# Patient Record
Sex: MALE | Race: WHITE | Employment: OTHER | URBAN - METROPOLITAN AREA
[De-identification: names, ages, dates, MRNs, and addresses within clinical notes are randomized per-mention and may not be internally consistent; named-entity substitution may affect disease eponyms.]

---

## 2017-01-11 ENCOUNTER — OFFICE VISIT (OUTPATIENT)
Dept: PAIN MANAGEMENT | Age: 52
End: 2017-01-11

## 2017-01-11 VITALS — HEIGHT: 69 IN | SYSTOLIC BLOOD PRESSURE: 148 MMHG | DIASTOLIC BLOOD PRESSURE: 86 MMHG | HEART RATE: 112 BPM

## 2017-01-11 DIAGNOSIS — M79.2 NEURITIS: ICD-10-CM

## 2017-01-11 DIAGNOSIS — Z79.899 ENCOUNTER FOR LONG-TERM (CURRENT) USE OF HIGH-RISK MEDICATION: ICD-10-CM

## 2017-01-11 DIAGNOSIS — R06.83 SNORES: ICD-10-CM

## 2017-01-11 DIAGNOSIS — F51.04 CHRONIC INSOMNIA: ICD-10-CM

## 2017-01-11 DIAGNOSIS — G89.4 CHRONIC PAIN SYNDROME: Primary | ICD-10-CM

## 2017-01-11 DIAGNOSIS — R10.84 ABDOMINAL PAIN, GENERALIZED: ICD-10-CM

## 2017-01-11 DIAGNOSIS — F17.200 SMOKER: ICD-10-CM

## 2017-01-11 DIAGNOSIS — K86.1 CHRONIC PANCREATITIS, UNSPECIFIED PANCREATITIS TYPE (HCC): ICD-10-CM

## 2017-01-11 LAB
ALCOHOL UR POC: NORMAL
AMPHETAMINES UR POC: NORMAL
BARBITURATES UR POC: NORMAL
BENZODIAZEPINES UR POC: NORMAL
BUPRENORPHINE UR POC: NORMAL
CANNABINOIDS UR POC: NORMAL
CARISOPRODOL UR POC: NORMAL
COCAINE UR POC: NORMAL
FENTANYL UR POC: NORMAL
MDMA/ECSTASY UR POC: NORMAL
METHADONE UR POC: NORMAL
METHAMPHETAMINE UR POC: NORMAL
METHYLPHENIDATE UR POC: NORMAL
OPIATES UR POC: NORMAL
OXYCODONE UR POC: NORMAL
PHENCYCLIDINE UR POC: NORMAL
PROPOXYPHENE UR POC: NORMAL
TRAMADOL UR POC: NORMAL
TRICYCLICS UR POC: NORMAL

## 2017-01-11 RX ORDER — HYDROMORPHONE HYDROCHLORIDE 2 MG/1
TABLET ORAL
Qty: 45 TAB | Refills: 0 | Status: SHIPPED | OUTPATIENT
Start: 2017-01-11 | End: 2017-04-23

## 2017-01-11 RX ORDER — HYDROMORPHONE HYDROCHLORIDE 2 MG/1
TABLET ORAL
Qty: 45 TAB | Refills: 0 | Status: SHIPPED | OUTPATIENT
Start: 2017-01-11 | End: 2018-08-21

## 2017-01-11 RX ORDER — MORPHINE SULFATE 30 MG/1
30 CAPSULE, EXTENDED RELEASE ORAL 2 TIMES DAILY
Qty: 60 CAP | Refills: 0 | Status: SHIPPED | OUTPATIENT
Start: 2017-01-11 | End: 2017-02-10

## 2017-01-11 NOTE — PROGRESS NOTES
HISTORY OF PRESENT ILLNESS  Orlando Cole is a 46 y.o. male. HPI  The patient presents today for follow up of chronic  severe pain which is generalized abdominal pain related to chronic, recurrent pancreatitis. His headaches cme go. At his last visit he was changed from percocet to roslyn bid whic is working fairly well. His work schedule has seemed to aggravate his pain but his current treament regimen is helping to manage his pain lvel. The patient denies any significant changes since last f/u. He tolerates medications without side effects. Orlando Cole reports no change in sleep or constipation. He takes zquil prn for sleep.  he does smoke and does snore. No history of sleep study. He is using the e-cigarette. He denies constipation. The patient reports 60-70% relief with current medications. His pain is characterized as burning, throbbing, aching, and cramping with nausea and rare vomiting. Sometimes foods will aggravate his pain (vinegar and pizza) along with the weather. .  Rest and medication alleviate his pain. 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 The patient reports functional improvement and QOL with pain medication. ,    He is complaining of episodes of breakthrough pain, which he does not have anything to cover him for this.  appears consistent'  UDS reviewed and appears consistent  Review of Systems   Constitutional: Positive for malaise/fatigue. Gastrointestinal: Positive for abdominal pain (cramping). Negative for constipation. 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 Psychiatric/Behavioral: The patient has insomnia. All other systems reviewed and are negative. Physical Exam   Constitutional: He is oriented to person, place, and time. He appears well-developed and well-nourished. No distress. HENT:   Head: Normocephalic.    Right Ear: External ear normal.   Left Ear: External ear normal.   Eyes: Conjunctivae and EOM are normal. Pupils are equal, round, and reactive to light. Neck: Normal range of motion. Pulmonary/Chest: Effort normal. No respiratory distress. Abdominal: There is tenderness in the right upper quadrant and right lower quadrant. Neurological: He is alert and oriented to person, place, and time. Skin: Skin is warm and dry. Psychiatric: He has a normal mood and affect. His behavior is normal. Judgment and thought content normal.   Nursing note and vitals reviewed. The patient was advised about elevated blood pressure to discuss with pcp. ASSESSMENT and PLAN  Encounter Diagnoses   Name Primary?  Chronic pain syndrome Yes    Abdominal pain, generalized     Chronic pancreatitis, unspecified pancreatitis type ([facility]     Encounter for long-term (current) use of high-risk medication     Diabetes mellitus, insulin dependent (IDDM), controlled (HCC)     Neuritis     Chronic insomnia     Snores     Smoker      Orders Placed This Encounter    DRUG SCREEN    REFERRAL TO SLEEP STUDIES    AMB POC DRUG SCREEN ()    Morphine (RIYA) 30 mg ER capsule    Morphine (RIYA) 30 mg ER capsule    HYDROmorphone (DILAUDID) 2 mg tablet    HYDROmorphone (DILAUDID) 2 mg tablet        Patient Instructions   1. Continue medications as prescribed. 2. Follow up in two months  3. Start dilaudid 2 mg take one tab up to twice a day pre severe pain (max #45 per month)/only for as needed pn   4. Continue riya as prescribed for your long acting opioid/continue to take on a schedule    No concern for misuse abuse or diversion  40 minutes spent in visit face to face with the patient.     >50% of time spent counseling and coordinating care

## 2017-01-11 NOTE — PROGRESS NOTES
Nursing Notes    Patient presents to the office today in follow-up. Patient rates his pain at 7/10 on the numerical pain scale. Reviewed medications with counts as follows:    Rx Date filled Qty Dispensed Pill Count Last Dose Short   Morphine sulfate ER 30 mg 12/12/16 60 5 today no                                    POC UDS was performed in office today. Any new labs or imaging since last appointment? NO    Have you been to an emergency room (ER) or urgent care clinic since your last visit? NO            Have you been hospitalized since your last visit? NO     If yes, where, when, and reason for visit? Have you seen or consulted any other health care providers outside of the [address] [place] [address]  since your last visit? NO     If yes, where, when, and reason for visit? HM deferred to pcp.

## 2017-01-11 NOTE — PATIENT INSTRUCTIONS
1.  Continue medications as prescribed. 2. Follow up in two months  3. Start dilaudid 2 mg take one tab up to twice a day pre severe pain (max #45 per month)/only for as needed pn   4.  Continue rolsyn as prescribed for your long acting opioid/continue to take on a schedule

## 2017-01-11 NOTE — MR AVS SNAPSHOT
Visit Information Date & Time Provider Department Dept. Phone Encounter #  
 1/11/2017  9:00 AM Emiliana Chavira, [address] [place] [address] for Pain Management [id number] Follow-up Instructions Return in about 2 months (around 3/11/2017). Upcoming Health Maintenance Date Due Hepatitis C Screening 1965 HEMOGLOBIN A1C Q6M 1965 LIPID PANEL Q1 1965 FOOT EXAM Q1 7/24/1975 MICROALBUMIN Q1 7/24/1975 EYE EXAM RETINAL OR DILATED Q1 7/24/1975 Pneumococcal 19-64 Medium Risk (1 of 1 - PPSV23) 7/24/1984 DTaP/Tdap/Td series (1 - Tdap) 7/24/1986 FOBT Q 1 YEAR AGE 50-75 7/24/2015 INFLUENZA AGE 9 TO ADULT 8/1/2016 Allergies as of 1/11/2017  Review Complete On: 1/11/2017 By: JUVENAL Suero Severity Noted Reaction Type Reactions Adhesive High 07/22/2016    Contact Dermatitis Severe blistering from steri strips following surgery (no systemic reaction or breathing issues) Current Immunizations  Never Reviewed No immunizations on file. Not reviewed this visit You Were Diagnosed With   
  
 Codes Comments Chronic pain syndrome    -  Primary ICD-10-CM: G89.4 ICD-9-CM: 338. 4 Abdominal pain, generalized     ICD-10-CM: R10.84 ICD-9-CM: 789.07 Chronic pancreatitis, unspecified pancreatitis type ([facility]     ICD-10-CM: K86.1 ICD-9-CM: 871.1 Encounter for long-term (current) use of high-risk medication     ICD-10-CM: Z79.899 ICD-9-CM: V58.69 Diabetes mellitus, insulin dependent (IDDM), controlled ([facility]     ICD-10-CM: E11.9, Z79.4 ICD-9-CM: 250.00, V58.67 Neuritis     ICD-10-CM: M79.2 ICD-9-CM: 729.2 Chronic insomnia     ICD-10-CM: F51.04 
ICD-9-CM: 780.52 Snores     ICD-10-CM: R06.83 
ICD-9-CM: 786.09 Smoker     ICD-10-CM: G86.205 ICD-9-CM: 305.1 Vitals BP Pulse Height(growth percentile) Smoking Status 148/86 (!) 112 5' 9\" (1.753 m) Current Every Day Smoker Vitals History Preferred Pharmacy Pharmacy Name Phone [address] [place] [address] [phone] Your Updated Medication List  
  
   
This list is accurate as of: 1/11/17 10:21 AM.  Always use your most recent med list. ADVIL LIQUI- mg Cap Generic drug:  ibuprofen Take 3-4 Tabs by mouth. As needed  
  
 cholecalciferol 400 unit Tab tablet Commonly known as:  VITAMIN D3 Take  by mouth two (2) times a day. diphenhydrAMINE 25 mg capsule Commonly known as:  BENADRYL Take 1 Cap by mouth nightly as needed. fenofibrate micronized 200 mg capsule Commonly known as:  LOFIBRA Take 200 mg by mouth every morning. gemfibrozil 600 mg tablet Commonly known as:  LOPID Take 600 mg by mouth two (2) times a day. * HYDROmorphone 2 mg tablet Commonly known as:  DILAUDID Take one tab po up to 1-2 times per day prn severe pain (due to fill 1/11/17) MAX #45 per month  
  
 * HYDROmorphone 2 mg tablet Commonly known as:  DILAUDID Take 1 tab po up to 1-2 times per day prn severe pain (due to fill 2/9/17) MAX #45 per month LANTUS 100 unit/mL injection Generic drug:  insulin glargine 70 Units by SubCUTAneous route daily. LEG CRAMP TREATMENT PO Take  by mouth.  
  
 lisinopril 40 mg tablet Commonly known as:  Yamile Maksim Take 40 mg by mouth two (2) times a day. magnesium oxide 420 mg Tab Take  by mouth two (2) times a day. metoprolol tartrate 50 mg tablet Commonly known as:  LOPRESSOR Take 25 mg by mouth two (2) times a day. * morphine SR 50 mg capsule Commonly known as:  RIYA Take 30 mg by mouth two (2) times a day. * Morphine 30 mg ER capsule Commonly known as:  RIYA Take one tab po every day X 15 days then increase to one tab every 12 hours For chronic pain. (due to fill 5/12/16)  Indications: CHRONIC PAIN, NEUROPATHIC PAIN, SEVERE PAIN  
  
 * Morphine 30 mg ER capsule Commonly known as:  RIYA Take 1 Cap by mouth two (2) times a day for 30 days. Max Daily Amount: 2 Caps. For chronic pain. Due to fill 01/11/17  Indications: CHRONIC PAIN, NEUROPATHIC PAIN, SEVERE PAIN  
  
 * Morphine 30 mg ER capsule Commonly known as:  RIYA Take 1 Cap by mouth two (2) times a day for 30 days. Max Daily Amount: 2 Caps. For chronic pain. Due to fill 02/09/17  Indications: CHRONIC PAIN, NEUROPATHIC PAIN, SEVERE PAIN  
  
 MULTIVITAMIN PO Take  by mouth. NOVOLOG SC  
by SubCUTAneous route Before breakfast, lunch, and dinner. Indications: sliding scale  
  
 omeprazole 20 mg capsule Commonly known as:  PRILOSEC Take 20 mg by mouth daily. PERCOCET 7.5-325 mg per tablet Generic drug:  oxyCODONE-acetaminophen Take  by mouth every four (4) hours as needed. pioglitazone 30 mg tablet Commonly known as:  ACTOS Take  by mouth daily. Potassium Gluconate 595 mg (99 mg) tablet Take  by mouth three (3) times daily (with meals). vardenafil 20 mg tablet Commonly known as:  LEVITRA Take 20 mg by mouth as needed. ZENPEP PO Take  by mouth three (3) times daily (with meals). * Notice: This list has 6 medication(s) that are the same as other medications prescribed for you. Read the directions carefully, and ask your doctor or other care provider to review them with you. Prescriptions Printed Refills Morphine (RIYA) 30 mg ER capsule 0 Sig: Take 1 Cap by mouth two (2) times a day for 30 days. Max Daily Amount: 2 Caps. For chronic pain. Due to fill 01/11/17  Indications: CHRONIC PAIN, NEUROPATHIC PAIN, SEVERE PAIN Class: Print Route: Oral  
 Morphine (RIYA) 30 mg ER capsule 0 Sig: Take 1 Cap by mouth two (2) times a day for 30 days.  Max Daily Amount: 2 Caps. For chronic pain. Due to fill 02/09/17  Indications: CHRONIC PAIN, NEUROPATHIC PAIN, SEVERE PAIN Class: Print Route: Oral  
 HYDROmorphone (DILAUDID) 2 mg tablet 0 Sig: Take one tab po up to 1-2 times per day prn severe pain (due to fill 1/11/17) MAX #45 per month Class: Print HYDROmorphone (DILAUDID) 2 mg tablet 0 Sig: Take 1 tab po up to 1-2 times per day prn severe pain (due to fill 2/9/17) MAX #45 per month Class: Print We Performed the Following REFERRAL TO SLEEP STUDIES [REF99 Custom] Comments:  
 Please evaluate patient for sleep apnea (smoker, snores, on chronic opioids) Follow-up Instructions Return in about 2 months (around 3/11/2017). Referral Information Referral ID Referred By Referred To  
  
 [id number] [facility] L[facility] Not Available Visits Status Start Date End Date 1 New Request 1/11/17 1/11/18 If your referral has a status of pending review or denied, additional information will be sent to support the outcome of this decision. Patient Instructions 1. Continue medications as prescribed. 2. Follow up in two months 3. Start dilaudid 2 mg take one tab up to twice a day pre severe pain (max #45 per month)/only for as needed pn  
4. Continue roslyn as prescribed for your long acting opioid/continue to take on a schedule Introducing \[facility] [place] [facility]\"" & HEALTH SERVICES! Kalli Muir introduces [employer] patient portal. Now you can access parts of your medical record, email your doctor's office, and request medication refills online. 1. In your internet browser, go to [email or web address] 2. Click on the First Time User? Click Here link in the Sign In box. You will see the New Member Sign Up page. 3. Enter your [employer] Access Code exactly as it appears below. You will not need to use this code after youve completed the sign-up process.  If you do not sign up before the expiration date, you must request a new code. · [employer] Access Code: [email or web address] Expires: 4/11/2017 10:16 AM 
 
4. Enter the last four digits of your Social Security Number (xxxx) and Date of Birth (mm/dd/yyyy) as indicated and click Submit. You will be taken to the next sign-up page. 5. Create a [employer] ID. This will be your [employer] login ID and cannot be changed, so think of one that is secure and easy to remember. 6. Create a [employer] password. You can change your password at any time. 7. Enter your Password Reset Question and Answer. This can be used at a later time if you forget your password. 8. Enter your e-mail address. You will receive e-mail notification when new information is available in [address] 9. Click Sign Up. You can now view and download portions of your medical record. 10. Click the Download Summary menu link to download a portable copy of your medical information. If you have questions, please visit the Frequently Asked Questions section of the [employer] website. Remember, [employer] is NOT to be used for urgent needs. For medical emergencies, dial 911. Now available from your iPhone and Android! Please provide this summary of care documentation to your next provider. Your primary care clinician is listed as Marcos Shannon. If you have any questions after today's visit, please call [phone].

## 2017-01-30 ENCOUNTER — DOCUMENTATION ONLY (OUTPATIENT)
Dept: PAIN MANAGEMENT | Age: 52
End: 2017-01-30

## 2017-03-02 ENCOUNTER — TELEPHONE (OUTPATIENT)
Dept: SLEEP MEDICINE | Age: 52
End: 2017-03-02

## 2017-03-15 ENCOUNTER — DOCUMENTATION ONLY (OUTPATIENT)
Dept: PAIN MANAGEMENT | Age: 52
End: 2017-03-15

## 2017-03-15 NOTE — PROGRESS NOTES
The office received a fax from [facility]. The pt's sleep study referral was faxed there. They have tried several times to reach the pt and there were messages left for the pt. He has not been able to be reached by them and he has not contacted them.

## 2018-08-22 PROBLEM — I50.9 CHF (CONGESTIVE HEART FAILURE) (HCC): Status: ACTIVE | Noted: 2018-08-22

## 2018-08-22 PROBLEM — I10 ACCELERATED HYPERTENSION: Status: ACTIVE | Noted: 2018-08-22

## 2018-08-22 PROBLEM — R60.1 ANASARCA: Status: ACTIVE | Noted: 2018-08-22

## 2018-08-23 PROBLEM — N04.9 NEPHROTIC SYNDROME: Status: ACTIVE | Noted: 2018-08-21

## 2018-09-17 ENCOUNTER — OFFICE VISIT (OUTPATIENT)
Dept: FAMILY MEDICINE CLINIC | Age: 53
End: 2018-09-17

## 2018-09-17 VITALS
OXYGEN SATURATION: 99 % | TEMPERATURE: 98.7 F | RESPIRATION RATE: 16 BRPM | BODY MASS INDEX: 36.76 KG/M2 | WEIGHT: 248.2 LBS | HEIGHT: 69 IN | SYSTOLIC BLOOD PRESSURE: 166 MMHG | HEART RATE: 92 BPM | DIASTOLIC BLOOD PRESSURE: 94 MMHG

## 2018-09-17 DIAGNOSIS — Z79.4 CONTROLLED TYPE 2 DIABETES MELLITUS WITH OTHER SPECIFIED COMPLICATION, WITH LONG-TERM CURRENT USE OF INSULIN (HCC): ICD-10-CM

## 2018-09-17 DIAGNOSIS — K86.1 IDIOPATHIC CHRONIC PANCREATITIS (HCC): Primary | ICD-10-CM

## 2018-09-17 DIAGNOSIS — I10 ESSENTIAL HYPERTENSION: ICD-10-CM

## 2018-09-17 DIAGNOSIS — N04.9 NEPHROTIC SYNDROME: ICD-10-CM

## 2018-09-17 DIAGNOSIS — Z00.00 HEALTH CARE MAINTENANCE: ICD-10-CM

## 2018-09-17 DIAGNOSIS — E78.2 MIXED HYPERLIPIDEMIA: ICD-10-CM

## 2018-09-17 DIAGNOSIS — R10.9 CHRONIC ABDOMINAL PAIN: ICD-10-CM

## 2018-09-17 DIAGNOSIS — R29.818 SUSPECTED SLEEP APNEA: ICD-10-CM

## 2018-09-17 DIAGNOSIS — F41.9 ANXIETY: ICD-10-CM

## 2018-09-17 DIAGNOSIS — M79.10 MYALGIA: ICD-10-CM

## 2018-09-17 DIAGNOSIS — E11.69 CONTROLLED TYPE 2 DIABETES MELLITUS WITH OTHER SPECIFIED COMPLICATION, WITH LONG-TERM CURRENT USE OF INSULIN (HCC): ICD-10-CM

## 2018-09-17 DIAGNOSIS — I47.1 PAROXYSMAL SVT (SUPRAVENTRICULAR TACHYCARDIA) (HCC): ICD-10-CM

## 2018-09-17 DIAGNOSIS — G89.29 CHRONIC ABDOMINAL PAIN: ICD-10-CM

## 2018-09-17 RX ORDER — FENOFIBRATE 48 MG/1
48 TABLET, COATED ORAL DAILY
Qty: 30 TAB | Refills: 2 | Status: SHIPPED | OUTPATIENT
Start: 2018-09-17 | End: 2018-09-24 | Stop reason: SDUPTHER

## 2018-09-17 RX ORDER — METOPROLOL SUCCINATE 25 MG/1
25 TABLET, EXTENDED RELEASE ORAL DAILY
Qty: 30 TAB | Refills: 2 | Status: SHIPPED | OUTPATIENT
Start: 2018-09-17 | End: 2018-11-24 | Stop reason: SDUPTHER

## 2018-09-17 RX ORDER — GABAPENTIN 300 MG/1
CAPSULE ORAL
Qty: 90 CAP | Refills: 1 | Status: SHIPPED | OUTPATIENT
Start: 2018-09-17 | End: 2019-02-12 | Stop reason: SDUPTHER

## 2018-09-17 NOTE — MR AVS SNAPSHOT
Shahram Yarbrough 
 
 
 [address] [facility] [address] [address] 
[phone] Patient: Cielo Carias MRN: [id number] :1965 Visit Information Date & Time Provider Department Dept. Phone Encounter #  
 2018  1:30 PM Kristen Nelson MD [address] [facility] [address] [phone] [id number] Follow-up Instructions Return in about 4 weeks (around 10/15/2018), or if symptoms worsen or fail to improve, for abdominal pain, , HTN. Upcoming Health Maintenance Date Due  
 FOOT EXAM Q1 1975 EYE EXAM RETINAL OR DILATED Q1 1975 Pneumococcal 19-64 Highest Risk (1 of 3 - PCV13) 1984 DTaP/Tdap/Td series (1 - Tdap) 1986 FOBT Q 1 YEAR AGE 50-75 2015 Influenza Age 5 to Adult 2018 HEMOGLOBIN A1C Q6M 2019 MICROALBUMIN Q1 2019 LIPID PANEL Q1 2019 Allergies as of 2018  Review Complete On: 2018 By: Kristen Nelson MD  
  
 Severity Noted Reaction Type Reactions Adhesive High 2016    Contact Dermatitis Severe blistering from steri strips following surgery (no systemic reaction or breathing issues) Lipitor [Atorvastatin]  2018    Myalgia Current Immunizations  Never Reviewed No immunizations on file. Not reviewed this visit You Were Diagnosed With   
  
 Codes Comments Idiopathic chronic pancreatitis ([facility]    -  Primary ICD-10-CM: K86.1 ICD-9-CM: 381.1 Paroxysmal SVT (supraventricular tachycardia) (HCC)     ICD-10-CM: I47.1 ICD-9-CM: 427.0 Chronic abdominal pain     ICD-10-CM: R10.9, G89.29 ICD-9-CM: 789.00, 338.29 Anxiety     ICD-10-CM: F41.9 ICD-9-CM: 300.00 Suspected sleep apnea     ICD-10-CM: R29.818 ICD-9-CM: 781.99 Controlled type 2 diabetes mellitus with other specified complication, with long-term current use of insulin (HCC)     ICD-10-CM: E11.69, Z79.4 ICD-9-CM: 250.80, V58.67   
 Nephrotic syndrome     ICD-10-CM: N04.9 ICD-9-CM: 581.9 Mixed hyperlipidemia     ICD-10-CM: E78.2 ICD-9-CM: 272.2 Myalgia     ICD-10-CM: M79.1 ICD-9-CM: 729.1 Essential hypertension     ICD-10-CM: I10 
ICD-9-CM: 401.9 Vitals BP Pulse Temp Resp Height(growth percentile) Weight(growth percentile) (!) 166/94 92 98.7 °F (37.1 °C) (Oral) 16 5' 9\" (1.753 m) 248 lb 3.2 oz (112.6 kg) SpO2 BMI Smoking Status 99% 36.65 kg/m2 Current Every Day Smoker Vitals History BMI and BSA Data Body Mass Index Body Surface Area  
 36.65 kg/m 2 2.34 m 2 Preferred Pharmacy Pharmacy Name Phone [facility] [address] [facility], [facility] [place] [address] [phone] Your Updated Medication List  
  
   
This list is accurate as of 9/17/18  2:42 PM.  Always use your most recent med list.  
  
  
  
  
 escitalopram oxalate 10 mg tablet Commonly known as:  Arty Calvin Take 10 mg by mouth daily. fenofibrate nanocrystallized 48 mg tablet Commonly known as:  [employer] Take 1 Tab by mouth daily for 90 days. furosemide 40 mg tablet Commonly known as:  LASIX Take 1 Tab by mouth daily. Pt may take an extra dose at 1pm if he has increased swelling  
  
 gabapentin 300 mg capsule Commonly known as:  NEURONTIN Take 1 cap nightly for 3 nights then increase to 1 cap PO TID HYDROmorphone 4 mg tablet Commonly known as:  DILAUDID Take 1 Tab by mouth every four (4) hours as needed. Max Daily Amount: 24 mg. LANTUS U-100 INSULIN 100 unit/mL injection Generic drug:  insulin glargine 75 Units by SubCUTAneous route daily. 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  
  
 lisinopril 40 mg tablet Commonly known as:  Kandy Escort Take 40 mg by mouth daily. magnesium oxide 400 mg tablet Commonly known as:  MAG-OX Take 1 Tab by mouth two (2) times daily (with meals). metoprolol succinate 25 mg XL tablet Commonly known as:  TOPROL-XL Take 1 Tab by mouth daily for 90 days. naloxone 4 mg/actuation nasal spray Commonly known as:  [employer] Use 1 spray intranasally, then discard. Repeat with new spray every 2 min as needed for opioid overdose symptoms, alternating nostrils. NOVOLOG SC  
by SubCUTAneous route Before breakfast, lunch, and dinner.  = 4 units 121-160 = 6 units 161-200 = 10 units 201-250 = 12 units 251-300 = 16 units 301-400 = 20 units 401-500 = 30 units >501 = 40 units  Indications: sliding scale  
  
 omeprazole 20 mg capsule Commonly known as:  PRILOSEC Take 20 mg by mouth daily. OTHER Kratom  
  
 potassium chloride 20 mEq tablet Commonly known as:  K-DUR, KLOR-CON Take 1 Tab by mouth daily. Take an extra dose if you take the extra dose of Furosemide ZENPEP PO Take 20,000 Units by mouth three (3) times daily (with meals). Prescriptions Sent to Pharmacy Refills  
 gabapentin (NEURONTIN) 300 mg capsule 1 Sig: Take 1 cap nightly for 3 nights then increase to 1 cap PO TID Class: Normal  
 Pharmacy: [facility] [address] [place] [address] Ph #: [phone]  
 metoprolol succinate (TOPROL-XL) 25 mg XL tablet 2 Sig: Take 1 Tab by mouth daily for 90 days. Class: Normal  
 Pharmacy: [facility] [address] [place] [address] Ph #: [phone] Route: Oral  
 fenofibrate nanocrystallized (TRICOR) 48 mg tablet 2 Sig: Take 1 Tab by mouth daily for 90 days. Class: Normal  
 Pharmacy: [facility] [address] [facility], [address] [place] [address] Ph #: [phone] Route: Oral  
  
We Performed the Following REFERRAL TO OPHTHALMOLOGY [REF57 Custom] Comments:  
 Please evaluate patient for diabetic retinopathy and glaucoma screening [place]. Follow-up Instructions Return in about 4 weeks (around 10/15/2018), or if symptoms worsen or fail to improve, for abdominal pain, , HTN. To-Do List   
 09/17/2018 Lab:  CK   
  
 09/17/2018 Lab:  MAGNESIUM   
  
 09/17/2018 Lab:  METABOLIC PANEL, COMPREHENSIVE   
  
 09/17/2018 Lab:  PHOSPHORUS Referral Information Referral ID Referred By Referred To  
  
 [id number] Elizabeth Olvera Not Available Visits Status Start Date End Date 1 New Request 9/17/18 9/17/19 If your referral has a status of pending review or denied, additional information will be sent to support the outcome of this decision. Patient Instructions For chronic abdominal pain: 
Take gabapentin 300 mg nightly for 3 nights then 300 mg three times a day Drink at least 2 liters of water per day Try to stop taking kratom, taper down each day until you have quit For blood pressure: 
Start taking metoprolol XL 25 mg daily Continue lisinopril 40 mg daily 
continue lasix 40 mg daily Check blood pressure mid-day 2-3  x per week and write down Make sure you are seated for at least  5-10 minutes, legs uncrossed and back resting Bring log to clinic next visit Max salt per day 2 grams (2000 mg) For High Cholesterol: 
Start taking fenofibrate 48 mg daily Continue to work on low animal fat diet and increase exercise Pancreatitis: Care Instructions Your Care Instructions The pancreas is an organ behind the stomach. It makes hormones and enzymes to help your body digest food. But if these enzymes attack the pancreas, it can get inflamed. This is called pancreatitis. Most cases are caused by gallstones or by heavy alcohol use. If you take care of yourself at home, it will help you get better.  It will also help you avoid more problems with your pancreas. Follow-up care is a key part of your treatment and safety. Be sure to make and go to all appointments, and call your doctor if you are having problems. It's also a good idea to know your test results and keep a list of the medicines you take. How can you care for yourself at home? · Drink clear liquids and eat bland foods until you feel better. [place] foods include rice, dry toast, and crackers. They also include bananas and applesauce. · Eat a low-fat diet until your doctor says your pancreas is healed. · Do not drink alcohol. Tell your doctor if you need help to quit. Counseling, support groups, and sometimes medicines can help you stay sober. · Be safe with medicines. Read and follow all instructions on the label. ¨ If the doctor gave you a prescription medicine for pain, take it as prescribed. ¨ If you are not taking a prescription pain medicine, ask your doctor if you can take an over-the-counter medicine. · If your doctor prescribed antibiotics, take them as directed. Do not stop taking them just because you feel better. You need to take the full course of antibiotics. · Get extra rest until you feel better. To prevent future problems with your pancreas · Do not drink alcohol. · Tell your doctors and pharmacist that you've had pancreatitis. They can help you avoid medicines that may cause this problem again. When should you call for help? Call 911 anytime you think you may need emergency care. For example, call if: 
  · You vomit blood or what looks like coffee grounds.  
  · Your stools are maroon or very bloody.  
 Call your doctor now or seek immediate medical care if: 
  · You have new or worse belly pain.  
  · Your stools are black and look like tar, or they have streaks of blood.  
  · You are vomiting.  
 Watch closely for changes in your health, and be sure to contact your doctor if: 
  · You do not get better as expected. Where can you learn more? Go to http://anaid-manjit.info/. Enter I279 in the search box to learn more about \"Pancreatitis: Care Instructions. \" Current as of: May 12, 2017 Content Version: 11.7 © 2003-7813 [employer]. Care instructions adapted under license by [employer] (which disclaims liability or warranty for this information). If you have questions about a medical condition or this instruction, always ask your healthcare professional. [address] any warranty or liability for your use of this information. Introducing [facility]! Josejohn Perales introduces LoveSurf patient portal. Now you can access parts of your medical record, email your doctor's office, and request medication refills online. 1. In your internet browser, go to [email or web address] 2. Click on the First Time User? Click Here link in the Sign In box. You will see the New Member Sign Up page. 3. Enter your LoveSurf Access Code exactly as it appears below. You will not need to use this code after youve completed the sign-up process. If you do not sign up before the expiration date, you must request a new code. · LoveSurf Access Code: [email or web address] Expires: 11/19/2018 10:54 PM 
 
4. Enter the last four digits of your Social Security Number (xxxx) and Date of Birth (mm/dd/yyyy) as indicated and click Submit. You will be taken to the next sign-up page. 5. Create a LoveSurf ID. This will be your LoveSurf login ID and cannot be changed, so think of one that is secure and easy to remember. 6. Create a LoveSurf password. You can change your password at any time. 7. Enter your Password Reset Question and Answer. This can be used at a later time if you forget your password. 8. Enter your e-mail address. You will receive e-mail notification when new information is available in [address] 9. Click Sign Up. You can now view and download portions of your medical record. 10. Click the Download Summary menu link to download a portable copy of your medical information. If you have questions, please visit the Frequently Asked Questions section of the [employer] website. Remember, [employer] is NOT to be used for urgent needs. For medical emergencies, dial 911. Now available from your iPhone and Android! Please provide this summary of care documentation to your next provider. Your primary care clinician is listed as Phys Other. If you have any questions after today's visit, please call [phone].

## 2018-09-17 NOTE — PROGRESS NOTES
Unable to obtain blood specimen today.   Patient given lab order and states that he will take it to [facility],

## 2018-09-17 NOTE — PATIENT INSTRUCTIONS
For chronic abdominal pain:  Take gabapentin 300 mg nightly for 3 nights then 300 mg three times a day  Drink at least 2 liters of water per day  Try to stop taking kratom, taper down each day until you have quit    For blood pressure:  Start taking metoprolol XL 25 mg daily  Continue lisinopril 40 mg daily  continue lasix 40 mg daily  Check blood pressure mid-day 2-3  x per week and write down  Make sure you are seated for at least  5-10 minutes, legs uncrossed and back resting  Bring log to clinic next visit  Max salt per day 2 grams (2000 mg)    For High Cholesterol:  Start taking fenofibrate 48 mg daily  Continue to work on low animal fat diet and increase exercise         Pancreatitis: Care Instructions  Your Care Instructions    The pancreas is an organ behind the stomach. It makes hormones and enzymes to help your body digest food. But if these enzymes attack the pancreas, it can get inflamed. This is called pancreatitis. Most cases are caused by gallstones or by heavy alcohol use. If you take care of yourself at home, it will help you get better. It will also help you avoid more problems with your pancreas. Follow-up care is a key part of your treatment and safety. Be sure to make and go to all appointments, and call your doctor if you are having problems. It's also a good idea to know your test results and keep a list of the medicines you take. How can you care for yourself at home? · Drink clear liquids and eat bland foods until you feel better. [place] foods include rice, dry toast, and crackers. They also include bananas and applesauce. · Eat a low-fat diet until your doctor says your pancreas is healed. · Do not drink alcohol. Tell your doctor if you need help to quit. Counseling, support groups, and sometimes medicines can help you stay sober. · Be safe with medicines. Read and follow all instructions on the label.   ¨ If the doctor gave you a prescription medicine for pain, take it as prescribed. ¨ If you are not taking a prescription pain medicine, ask your doctor if you can take an over-the-counter medicine. · If your doctor prescribed antibiotics, take them as directed. Do not stop taking them just because you feel better. You need to take the full course of antibiotics. · Get extra rest until you feel better. To prevent future problems with your pancreas  · Do not drink alcohol. · Tell your doctors and pharmacist that you've had pancreatitis. They can help you avoid medicines that may cause this problem again. When should you call for help? Call 911 anytime you think you may need emergency care. For example, call if:    · You vomit blood or what looks like coffee grounds.     · Your stools are maroon or very bloody.    Call your doctor now or seek immediate medical care if:    · You have new or worse belly pain.     · Your stools are black and look like tar, or they have streaks of blood.     · You are vomiting.    Watch closely for changes in your health, and be sure to contact your doctor if:    · You do not get better as expected. Where can you learn more? Go to http://anaid-manjit.info/. Enter G293 in the search box to learn more about \"Pancreatitis: Care Instructions. \"  Current as of: May 12, 2017  Content Version: 11.7  © 0961-9185 "[employer]. Care instructions adapted under license by [employer] (which disclaims liability or warranty for this information). If you have questions about a medical condition or this instruction, always ask your healthcare professional. [place] [address] any warranty or liability for your use of this information.

## 2018-09-17 NOTE — PROGRESS NOTES
INTERNAL MEDICINE INITIAL PROVIDER VISIT    SUBJECTIVE:     Chief Complaint   Patient presents with    Establish Care     Patient states that he was admitted to [facility] 3 weeks ago for edema and had a kidney biopsy. Patient states that he has appt with a nephrologist ( Dr. Butch Carey) next week. HPI: 48 y.o. male with PMHx significant for chronic pancreatitis and pancreatic insufficiency is here for the above chief complaint(s). Establish Care: Last PCP Dr. Fernanda Hodge MD phone [phone], fax [phone]. Last seen several months. Discharged from hospital [facility] 8/21-24/2018 for anasarca thought 2/2 nephrotic syndrome and uncontrolled HTN. Nephrotic syndrome/Anasarca/Fluid overload due to nephrotic syndrome: Biopsy obtained 9/14/2018. Per hospital d/c summary. \"During hospitalization nephrology consulted Dr Jannis Halsted of nephrology who suggested nephrotic syndrome as the pt had a Urine protein/creatinine ratio of 8.84. Serum albumin of 2.4 (8/23) after 50g of Albumin given on 8/22. Renal suggested that the nephrotic syndrome was most likely related to underlying diabetic nephropathy but certainly could be another glomerulonephritis such as minimal change disease or membranous nephropathy. Focal and segmental glomerular sclerosis could be considered due to his obesity but normally these patients do not have such a low serum albumin. Serologies were ordered. Renal suggested to consider renal biopsy once the pt's edema has reduced. \" Appointment Monday 9/24/2018. Since discharge swelling in legs decreased. No significant swelling today. Taking lasix 40 mg daily. Taking potassium 1 tab per day and magnesium BID. Echo 8/22/2018. Echocardiogram (8/22) revealed LVWF 58% and no valvular issues, no diastolic dysfunction noted on report      DM2: HbA1C 7.6 (8/22) Followed by endocrinology Dr. Wilson Never, last seen couple months ago, last eye exam >1 year, last foot exam 2-3 months ago.  Taking lantus 75 units nightly. Aspart insulin on SSI. On average 10-16 units per meal. by SubCUTAneous route Before breakfast, lunch, and dinner.  = 4 units  121-160 = 6 units  161-200 = 10 units  201-250 = 12 units  251-300 = 16 units  301-400 = 20 units  401-500 = 30 units  >501 = 40 units  Indications: sliding scale      Hypertension: Today BP is uncontrolled. Taking coreg 3.125 mg BID (added at [facility]), lisinopril 40 mg daily and lasxi 40 mg daily, last dose of these medicines last night, works nights. No side effects from medications. Medication good compliance. Working on low salt diet. Exercise rarely. Denies headache, lightheadedness, dizziness, vision changes, chest pain, rapid/irregular heart rate, shortness of breath, cough, abdominal pain, leg swelling, leg pain. 2/2 HTN ruled out with sleep study ordered a few years ago. No sleep study in past.  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 STOP-BANG: + snoring, + day time fatigue, - observied apneic episodes, + HTN, + BMI >35, +2 AGE >50, ? neck size >17 in male >15 in female, + male gender. 5/8 high risk VIV. CKD3/Nephrotic Syndrome: Cr 1.4 (8/24). Continues on lisinopril. Baseline 1.3. Has appointment with nephrologist Dr. Rom Guardado on next week monday. HLD: FLP (8/22) , , HDL 27, . Started on lipitor 20 mg at [facility], stopped 2/2 leg cramps. H/o fenofibrate in past, no adverse reactions.      Chronic pancreatitis/Pancreatic insufficiency: Pt is on ZenPep 20,000 international unit capsules daily. Has had a hx of pancreatectomy per Dr Danette Hill 2005. Reports chronic pancreatitis 2/2 elevated TG. Currently not drink alcohol. 1 ppd smoking. Several glasses of water intake per day. Drinking several glasses, 1 soda caffienated per day 1-2 cups coffee per day. Last seen pain mgmt referred by Dr. Nicole Mitchell 1 year ago and was given percocet 10 mg every 4 hours and then dilaudid 4 mg and didn't like the way these medicines made feel.  Last taken 1 year or so ago. Was seeing [facility] pain mgmt JUVENAL Maguire last visit 1/2018. PMPx reviewed: Dilaudid 4 mg #28 on d/c from hospital. 2/8/2017 last filled opiates in [facility] dilaudid 2 mg #45 and morphine ER 30 mg #60 Mela Maguire. Kratom taking for 1 year, not prescribed by doctor, getting on-line or herb pharmacy 1 mg every 4 hours. Dilaudid 4 mg not taking, ran out, was taking 1-2 per day. Pain today 2-3/10, last kratom 3 hours ago. No trials of elavil, lyrica, Ibuprofen taking for headaches 400 mg 2x per week to every other week. ROS: 12 point ROS completed positive per HPI and headaches chronic, chronic abdominal pain, low energy, intermittent palpitations, calf cramping some nights started after atorvastatin, stopped a couple of days ago, continued pain, sexual dysfunction, insomnia. Current Outpatient Prescriptions   Medication Sig    OTHER Kratom    gabapentin (NEURONTIN) 300 mg capsule Take 1 cap nightly for 3 nights then increase to 1 cap PO TID    metoprolol succinate (TOPROL-XL) 25 mg XL tablet Take 1 Tab by mouth daily for 90 days.  fenofibrate nanocrystallized (TRICOR) 48 mg tablet Take 1 Tab by mouth daily for 90 days.  furosemide (LASIX) 40 mg tablet Take 1 Tab by mouth daily. Pt may take an extra dose at 1pm if he has increased swelling    HYDROmorphone (DILAUDID) 4 mg tablet Take 1 Tab by mouth every four (4) hours as needed. Max Daily Amount: 24 mg.    magnesium oxide (MAG-OX) 400 mg tablet Take 1 Tab by mouth two (2) times daily (with meals).  potassium chloride (K-DUR, KLOR-CON) 20 mEq tablet Take 1 Tab by mouth daily. Take an extra dose if you take the extra dose of Furosemide    escitalopram oxalate (LEXAPRO) 10 mg tablet Take 10 mg by mouth daily.  lisinopril (PRINIVIL, ZESTRIL) 40 mg tablet Take 40 mg by mouth daily.  LIPASE/PROTEASE/AMYLASE (ZENPEP PO) Take 20,000 Units by mouth three (3) times daily (with meals).     omeprazole (PRILOSEC) 20 mg capsule Take 20 mg by mouth daily.  insulin glargine (LANTUS) 100 unit/mL injection 75 Units by SubCUTAneous route daily. 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  = 4 units  121-160 = 6 units  161-200 = 10 units  201-250 = 12 units  251-300 = 16 units  301-400 = 20 units  401-500 = 30 units  >501 = 40 units  Indications: sliding scale    naloxone (NARCAN) 4 mg/actuation nasal spray Use 1 spray intranasally, then discard. Repeat with new spray every 2 min as needed for opioid overdose symptoms, alternating nostrils. No current facility-administered medications for this visit.       Health Maintenance   Topic Date Due    FOOT EXAM Q1  07/24/1975    EYE EXAM RETINAL OR DILATED Q1  07/24/1975    Pneumococcal 19-64 Highest Risk (1 of 3 - PCV13) 07/24/1984    DTaP/Tdap/Td series (1 - Tdap) 07/24/1986    FOBT Q 1 YEAR AGE 50-75  07/24/2015    Influenza Age 9 to Adult  08/01/2018    HEMOGLOBIN A1C Q6M  02/22/2019    MICROALBUMIN Q1  08/22/2019    LIPID PANEL Q1  08/22/2019    Hepatitis C Screening  Completed       Medications and Allergies: Reviewed and confirmed in the chart    Past Medical Hx: Reviewed and confirmed in the chart  Past Medical History:   Diagnosis Date    Adverse effect of anesthesia     if wakes too fast he wakes up combative    Anxiety 2016    lexapro 10 mg daily    Chronic abdominal pain     Secondary to pancreatitis    Chronic pancreatitis ([address]     Diabetes type 2, controlled ([address] 2005    s/p partial pancreactomy 2005    Fatty liver     GERD (gastroesophageal reflux disease)     HLD (hyperlipidemia)     HTN (hypertension) 08/22/2018    Morbid obesity ([address]     Nephrotic syndrome 08/21/2018    Pancreatic insufficiency     Paroxysmal SVT (supraventricular tachycardia) ([address] 1998    intermittently occurs, last occured a couple weeks ago Patient Active Problem List   Diagnosis Code    Hypertrophy of salivary gland K11.1    Neuralgia, neuritis, and radiculitis, unspecified [id number]    Encounter for long-term (current) use of high-risk medication Z79.899    Chronic pain syndrome G89.4    Chronic pancreatitis ([facility] K86.1    Anasarca R60.1    Essential hypertension I10    Nephrotic syndrome N04.9    Paroxysmal SVT (supraventricular tachycardia) (HCC) I47.1    Pancreatic insufficiency K86.89    HLD (hyperlipidemia) E78.5    GERD (gastroesophageal reflux disease) K21.9    Fatty liver K76.0    Diabetes type 2, controlled ([facility] E11.9    Chronic abdominal pain R10.9, G89.29    Anxiety F41.9    Health care maintenance Z00.00       Family Hx, Surgical Hx, Social Hx: Reviewed and updated in EMR    OBJECTIVE:  Vitals:    09/17/18 1329   BP: (!) 166/94   Pulse: 92   Resp: 16   Temp: 98.7 °F (37.1 °C)   TempSrc: Oral   SpO2: 99%   Weight: 248 lb 3.2 oz (112.6 kg)   Height: 5' 9\" (1.753 m)       BP Readings from Last 3 Encounters:   09/17/18 (!) 166/94   09/12/18 (!) 189/144   08/24/18 153/83     Wt Readings from Last 3 Encounters:   09/17/18 248 lb 3.2 oz (112.6 kg)   09/12/18 242 lb (109.8 kg)   08/23/18 284 lb 13.4 oz (129.2 kg)       General: Pleasant middle aged man in no acute distress  HEENT: Head is atraumatic normo-cephalic. Pupils equally round and reactive to light, extraocular muscle intact, conjunctiva clear, non-icterus. Neck: Supple, no LAD, no palpable thyromegaly. CVS: Heart tachycardic. Audible S1 and S2. No murmurs, rubs or gallops. PULM: Lungs clear to auscultation bilaterally. No wheezes, rales or rhonchi. GI: Positive bowel sounds, soft, non-distended. Tender to deep palpation diffusely more RUQ no rebound or guarding. EXT: 2+ radial pulses bilaterally. Trace pedal edema bilaterally  Neuro: Alert and oriented x3. Gait wnl  MSE: mood euthymic, affect congruent and reactive.        Lab Results   Component Value Date/Time    WBC 8.1 08/23/2018 03:51 AM    HGB 12.6 08/23/2018 03:51 AM    HCT 39.3 08/23/2018 03:51 AM    PLATELET 325 99/14/2686 03:51 AM     Lab Results   Component Value Date/Time    Sodium 137 08/24/2018 03:55 AM    Potassium 4.6 08/24/2018 03:55 AM    Chloride 98 08/24/2018 03:55 AM    CO2 35 (H) 08/24/2018 03:55 AM    Glucose 84 08/24/2018 03:55 AM    BUN 20 08/24/2018 03:55 AM    Creatinine 1.4 (H) 08/24/2018 03:55 AM     Lab Results   Component Value Date/Time    Cholesterol, total 224 (H) 08/22/2018 08:02 AM    HDL Cholesterol 27 (L) 08/22/2018 08:02 AM    LDL, calculated 126 08/22/2018 08:02 AM    Triglyceride 356 (H) 08/22/2018 08:02 AM     ECHOCARDIOGRAM 8/2018  Normal left ventricular size and systolic function: calculated ejection  fraction of 58%  Right ventricular systolic function is normal: TAPSE: 1.8. No significant valvular pathology  No recent previous for comparison    Outside hospital labs and notes reviewed. Nursing Notes Reviewed    ASSESSMENT AND PLAN    ICD-10-CM ICD-9-CM    1. Idiopathic chronic pancreatitis (HCC) I45.0 063.9 METABOLIC PANEL, COMPREHENSIVE      gabapentin (NEURONTIN) 300 mg capsule      fenofibrate nanocrystallized (TRICOR) 48 mg tablet  Encouraged to d/c kratom  Discussed smoking cessation pt pre-contemplative  Discussed remaining hydrated   2. Paroxysmal SVT (supraventricular tachycardia) (HCC) I47.1 427.0 metoprolol succinate (TOPROL-XL) 25 mg XL tablet  Cut back on caffiene/smoking   3. Chronic abdominal pain L37.5 520.56 METABOLIC PANEL, COMPREHENSIVE    G89.29 338.29 gabapentin (NEURONTIN) 300 mg capsule TID discussed r/b/se  D/C kratom  D/C dilaudid   4. Anxiety F41.9 300.00 Continue lexapro   5. Suspected sleep apnea R29.818 781.99 Per pt preference will hold on sleep apnea testing referral   6.  Controlled type 2 diabetes mellitus with other specified complication, with long-term current use of insulin (HCC) E11.69 250.80 REFERRAL TO OPHTHALMOLOGY  ELISA signed for Dr. Nano Walden    Z79.4 V58.67    7. Nephrotic syndrome N04.9 581.9 MAGNESIUM      PHOSPHORUS  F/u with nephrology  ELISA signed   8. Mixed hyperlipidemia E78.2 272.2 fenofibrate nanocrystallized (TRICOR) 48 mg tablet  D/c lipitor   9. Myalgia Q90.5 637.2 METABOLIC PANEL, COMPREHENSIVE      CK   10. Essential hypertension A79 450.4 METABOLIC PANEL, COMPREHENSIVE      metoprolol succinate (TOPROL-XL) 25 mg XL tablet  Continue lisinopril and lasix  Low salt diet  BP log   11. Health care maintenance Z00.00 V70.0 ELISA signed for past records         Orders Placed This Encounter    METABOLIC PANEL, COMPREHENSIVE    CK    MAGNESIUM    PHOSPHORUS    REFERRAL TO OPHTHALMOLOGY    OTHER    gabapentin (NEURONTIN) 300 mg capsule    metoprolol succinate (TOPROL-XL) 25 mg XL tablet    fenofibrate nanocrystallized (TRICOR) 48 mg tablet       Future Appointments  Date Time Provider Department Center   10/15/2018 2:30 PM Briana Castorena MD [address] [place] [address] printed and provided to patient    Assessment and plan above discussed with patient, patient voiced understanding and agreement with plan. More than 50% of this 60 min visit was spent face to face counseling the patient about the etiology and treatment options for the above health conditions outlined in assessment and plan       Briana Castorena M.D.   [place] [phone]  [place] [phone]

## 2018-09-23 LAB
ALBUMIN SERPL-MCNC: 3.2 G/DL (ref 3.5–5.5)
ALBUMIN/GLOB SERPL: 1.3 {RATIO} (ref 1.2–2.2)
ALP SERPL-CCNC: 105 IU/L (ref 39–117)
ALT SERPL-CCNC: 8 IU/L (ref 0–44)
AST SERPL-CCNC: 12 IU/L (ref 0–40)
BILIRUB SERPL-MCNC: <0.2 MG/DL (ref 0–1.2)
BUN SERPL-MCNC: 24 MG/DL (ref 6–24)
BUN/CREAT SERPL: 16 (ref 9–20)
CALCIUM SERPL-MCNC: 8.4 MG/DL (ref 8.7–10.2)
CHLORIDE SERPL-SCNC: 96 MMOL/L (ref 96–106)
CK SERPL-CCNC: 272 U/L (ref 24–204)
CO2 SERPL-SCNC: 24 MMOL/L (ref 20–29)
CREAT SERPL-MCNC: 1.52 MG/DL (ref 0.76–1.27)
GLOBULIN SER CALC-MCNC: 2.4 G/DL (ref 1.5–4.5)
GLUCOSE SERPL-MCNC: 191 MG/DL (ref 65–99)
MAGNESIUM SERPL-MCNC: 2.7 MG/DL (ref 1.6–2.3)
PHOSPHATE SERPL-MCNC: 3.3 MG/DL (ref 2.5–4.5)
POTASSIUM SERPL-SCNC: 4.6 MMOL/L (ref 3.5–5.2)
PROT SERPL-MCNC: 5.6 G/DL (ref 6–8.5)
SODIUM SERPL-SCNC: 134 MMOL/L (ref 134–144)

## 2018-09-24 ENCOUNTER — TELEPHONE (OUTPATIENT)
Dept: FAMILY MEDICINE CLINIC | Age: 53
End: 2018-09-24

## 2018-09-24 DIAGNOSIS — R74.8 ELEVATED CK: ICD-10-CM

## 2018-09-24 DIAGNOSIS — E78.2 MIXED HYPERLIPIDEMIA: ICD-10-CM

## 2018-09-24 DIAGNOSIS — K86.1 IDIOPATHIC CHRONIC PANCREATITIS (HCC): ICD-10-CM

## 2018-09-24 DIAGNOSIS — N17.9 AKI (ACUTE KIDNEY INJURY) (HCC): ICD-10-CM

## 2018-09-24 DIAGNOSIS — E83.41 HYPERMAGNESEMIA: Primary | ICD-10-CM

## 2018-09-24 DIAGNOSIS — E83.51 LOW CALCIUM LEVELS: ICD-10-CM

## 2018-09-24 RX ORDER — FENOFIBRATE 48 MG/1
96 TABLET, COATED ORAL DAILY
Qty: 60 TAB | Refills: 2 | COMMUNITY
Start: 2018-09-24 | End: 2018-11-24 | Stop reason: SDUPTHER

## 2018-09-24 NOTE — TELEPHONE ENCOUNTER
Called to discuss lab results    Low protein/low calcium: Improved compared to hospital discharge. Continue to increase both in diet. GURPREET n CKD: Cr 1.4 8/22/2018, Recent Cr 1.52. Mild injury likely 2/2 dehydration. Water intake per day 3-4 per day bottles, 3 liters per day. Caffeine intake 1 cup in AM and 1 cup in PM.      Elevated magnesium: decrease supplements to once per day, plans to stop today per kidney doctor. Recheck in 2-3 weeks. Elevated CK: Likely from statin. Recheck in 2-3 weeks, increase water. Fenofibrate increased to 2 tabs per nephrologist.     Nephrologist: Dr. Jerry Street seen today. Mary Carey M.D.   [address], [address] [place] [address] [phone]  [place] [phone]

## 2018-09-24 NOTE — PROGRESS NOTES
Correction: patient not taking coreg after d/c from hospital.    Dung Barry M.D.   [address], [address] [place] [address], [address] [place] [address] [phone]  [place] [phone]

## 2018-10-19 ENCOUNTER — HOSPITAL ENCOUNTER (OUTPATIENT)
Dept: LAB | Age: 53
Discharge: HOME | End: 2018-10-19
Payer: COMMERCIAL

## 2018-10-19 DIAGNOSIS — R74.8 ELEVATED CK: ICD-10-CM

## 2018-10-19 DIAGNOSIS — G89.29 OTHER CHRONIC PAIN: Primary | ICD-10-CM

## 2018-10-19 DIAGNOSIS — E83.41 HYPERMAGNESEMIA: ICD-10-CM

## 2018-10-19 DIAGNOSIS — N17.9 AKI (ACUTE KIDNEY INJURY) (HCC): ICD-10-CM

## 2018-10-19 LAB
ALBUMIN SERPL-MCNC: 3.4 G/DL (ref 3.4–5)
ANION GAP SERPL CALC-SCNC: 8 MMOL/L (ref 3–18)
BUN SERPL-MCNC: 32 MG/DL (ref 7–18)
BUN/CREAT SERPL: 17 (ref 12–20)
CALCIUM SERPL-MCNC: 8.9 MG/DL (ref 8.5–10.1)
CHLORIDE SERPL-SCNC: 99 MMOL/L (ref 100–108)
CK SERPL-CCNC: 143 U/L (ref 39–308)
CO2 SERPL-SCNC: 25 MMOL/L (ref 21–32)
CREAT SERPL-MCNC: 1.86 MG/DL (ref 0.6–1.3)
GLUCOSE SERPL-MCNC: 370 MG/DL (ref 74–99)
MAGNESIUM SERPL-MCNC: 1.7 MG/DL (ref 1.6–2.6)
PHOSPHATE SERPL-MCNC: 3.4 MG/DL (ref 2.5–4.9)
POTASSIUM SERPL-SCNC: 5.2 MMOL/L (ref 3.5–5.5)
SODIUM SERPL-SCNC: 132 MMOL/L (ref 136–145)

## 2018-10-19 PROCEDURE — 86200 CCP ANTIBODY: CPT | Performed by: INTERNAL MEDICINE

## 2018-10-19 PROCEDURE — 86038 ANTINUCLEAR ANTIBODIES: CPT | Performed by: INTERNAL MEDICINE

## 2018-10-19 PROCEDURE — 82550 ASSAY OF CK (CPK): CPT | Performed by: INTERNAL MEDICINE

## 2018-10-19 PROCEDURE — 83735 ASSAY OF MAGNESIUM: CPT | Performed by: INTERNAL MEDICINE

## 2018-10-19 PROCEDURE — 80069 RENAL FUNCTION PANEL: CPT | Performed by: INTERNAL MEDICINE

## 2018-10-24 ENCOUNTER — TELEPHONE (OUTPATIENT)
Dept: FAMILY MEDICINE CLINIC | Age: 53
End: 2018-10-24

## 2018-10-24 LAB
ANA TITR SER IF: NEGATIVE {TITER}
CCP IGA+IGG SERPL IA-ACNC: 10 UNITS (ref 0–19)

## 2018-10-24 NOTE — TELEPHONE ENCOUNTER
Jackeline Pichardo with [facility] states received Add on for rhematoid factor.  Jackeline Pichardo states will need a new sample due to sample over 1days old and cannot run test.

## 2018-10-24 NOTE — TELEPHONE ENCOUNTER
LPN please call patient and inform of need to return for rheumatoid testing. London Villalobos M.D.   [address], [address] [place] [address], [address] [place] [address] [phone]  [place] [phone]  [phone]

## 2018-10-26 ENCOUNTER — LAB ONLY (OUTPATIENT)
Dept: FAMILY MEDICINE CLINIC | Age: 53
End: 2018-10-26

## 2018-10-26 ENCOUNTER — HOSPITAL ENCOUNTER (OUTPATIENT)
Dept: LAB | Age: 53
Discharge: HOME | End: 2018-10-26
Payer: COMMERCIAL

## 2018-10-26 DIAGNOSIS — G89.4 CHRONIC PAIN SYNDROME: Primary | ICD-10-CM

## 2018-10-26 DIAGNOSIS — G89.29 OTHER CHRONIC PAIN: ICD-10-CM

## 2018-10-26 PROCEDURE — 86430 RHEUMATOID FACTOR TEST QUAL: CPT | Performed by: INTERNAL MEDICINE

## 2018-10-26 NOTE — PROGRESS NOTES
Patient presents for lab draw ordered by:    Ordering Provider:  Dr Stefanie You Department/Practice:  [facility]  Phone:  [phone]  Date Ordered:  10/2018    The following labs were drawn and sent to [address] [place] [address]  by Nelson Chandra LPN:    Rh Factor    The following tubes were sent:     1 SST, 0Lav, 0Blue top, 0urine    Left AC cleansed using aseptic technique. Specimen obtained using a 21 gauge butterfly  with 1 attempt. Patient tolerated process well and there was no bleeding noted at site.

## 2018-10-27 LAB — RHEUMATOID FACT SER QL LA: NEGATIVE

## 2018-11-02 ENCOUNTER — TELEPHONE (OUTPATIENT)
Dept: FAMILY MEDICINE CLINIC | Age: 53
End: 2018-11-02

## 2018-11-02 DIAGNOSIS — N17.9 AKI (ACUTE KIDNEY INJURY) (HCC): Primary | ICD-10-CM

## 2018-11-09 ENCOUNTER — LAB ONLY (OUTPATIENT)
Dept: FAMILY MEDICINE CLINIC | Age: 53
End: 2018-11-09

## 2018-11-09 ENCOUNTER — HOSPITAL ENCOUNTER (OUTPATIENT)
Dept: LAB | Age: 53
Discharge: HOME | End: 2018-11-09
Payer: COMMERCIAL

## 2018-11-09 DIAGNOSIS — K76.0 FATTY LIVER: Primary | ICD-10-CM

## 2018-11-09 DIAGNOSIS — N17.9 AKI (ACUTE KIDNEY INJURY) (HCC): ICD-10-CM

## 2018-11-09 LAB
ALBUMIN SERPL-MCNC: 3.4 G/DL (ref 3.4–5)
ANION GAP SERPL CALC-SCNC: 7 MMOL/L (ref 3–18)
BUN SERPL-MCNC: 38 MG/DL (ref 7–18)
BUN/CREAT SERPL: 22 (ref 12–20)
CALCIUM SERPL-MCNC: 8.3 MG/DL (ref 8.5–10.1)
CHLORIDE SERPL-SCNC: 98 MMOL/L (ref 100–108)
CO2 SERPL-SCNC: 27 MMOL/L (ref 21–32)
CREAT SERPL-MCNC: 1.76 MG/DL (ref 0.6–1.3)
GLUCOSE SERPL-MCNC: 246 MG/DL (ref 74–99)
PHOSPHATE SERPL-MCNC: 3.5 MG/DL (ref 2.5–4.9)
POTASSIUM SERPL-SCNC: 6 MMOL/L (ref 3.5–5.5)
SODIUM SERPL-SCNC: 132 MMOL/L (ref 136–145)

## 2018-11-09 PROCEDURE — 80069 RENAL FUNCTION PANEL: CPT

## 2018-11-09 NOTE — PROGRESS NOTES
Patient presents for lab draw ordered by:    Ordering Provider:  Linnea Encarnacion Department/Practice:  [facility]  Phone:  [phone]  Date Ordered:  11/2018    The following labs were drawn and sent to [facility]  by Cristal Sepulveda LPN:    Renal Function Panel    The following tubes were sent:     1 SST, 0Lav, 0Blue top, 0urine    Right AC cleansed using aseptic technique. Specimen obtained using a 21 gauge butterfly  with 2 attempt. Patient tolerated process well and there was no bleeding noted at site.

## 2018-11-12 ENCOUNTER — TELEPHONE (OUTPATIENT)
Dept: FAMILY MEDICINE CLINIC | Age: 53
End: 2018-11-12

## 2018-11-12 NOTE — TELEPHONE ENCOUNTER
Called to discuss lab results. Ran out of lantus recently because of insurance picked up again and started taking again couple of week ago and novolog sliding scale a couple of weeks ago. Taking potassium last week 2 tabs daily and lasix 40 mg twice a day last week, plans to stop taking today because water weight is down now. Checking sugars 4-5 x per day. Typically before eating and 2 hours after eating. Will recheck labs next visit. No chest pain, myalgias. LPN please fax lab results from Friday 11/9/2018 to Dr. Sandy Weinberg M.D.   [address], [address] [place] [address], [address] [place] [address] [phone]  J -   [phone]

## 2018-11-20 ENCOUNTER — OFFICE VISIT (OUTPATIENT)
Dept: FAMILY MEDICINE CLINIC | Age: 53
End: 2018-11-20

## 2018-11-20 VITALS
TEMPERATURE: 97.8 F | HEART RATE: 88 BPM | BODY MASS INDEX: 36.67 KG/M2 | RESPIRATION RATE: 18 BRPM | OXYGEN SATURATION: 98 % | WEIGHT: 247.6 LBS | DIASTOLIC BLOOD PRESSURE: 69 MMHG | HEIGHT: 69 IN | SYSTOLIC BLOOD PRESSURE: 138 MMHG

## 2018-11-20 DIAGNOSIS — K21.9 GASTROESOPHAGEAL REFLUX DISEASE, ESOPHAGITIS PRESENCE NOT SPECIFIED: ICD-10-CM

## 2018-11-20 DIAGNOSIS — N17.9 AKI (ACUTE KIDNEY INJURY) (HCC): ICD-10-CM

## 2018-11-20 DIAGNOSIS — F41.9 ANXIETY AND DEPRESSION: ICD-10-CM

## 2018-11-20 DIAGNOSIS — E78.2 MIXED HYPERLIPIDEMIA: ICD-10-CM

## 2018-11-20 DIAGNOSIS — N04.9 NEPHROTIC SYNDROME: ICD-10-CM

## 2018-11-20 DIAGNOSIS — N18.9 CHRONIC KIDNEY DISEASE, UNSPECIFIED CKD STAGE: ICD-10-CM

## 2018-11-20 DIAGNOSIS — K86.1 IDIOPATHIC CHRONIC PANCREATITIS (HCC): ICD-10-CM

## 2018-11-20 DIAGNOSIS — I10 ESSENTIAL HYPERTENSION: ICD-10-CM

## 2018-11-20 DIAGNOSIS — K86.89 PANCREATIC INSUFFICIENCY: ICD-10-CM

## 2018-11-20 DIAGNOSIS — F32.A ANXIETY AND DEPRESSION: ICD-10-CM

## 2018-11-20 DIAGNOSIS — I47.1 PAROXYSMAL SVT (SUPRAVENTRICULAR TACHYCARDIA) (HCC): ICD-10-CM

## 2018-11-20 DIAGNOSIS — E10.40 TYPE 1 DIABETES MELLITUS WITH DIABETIC NEUROPATHY (HCC): Primary | ICD-10-CM

## 2018-11-20 PROBLEM — R60.1 ANASARCA: Status: RESOLVED | Noted: 2018-08-22 | Resolved: 2018-11-20

## 2018-11-20 RX ORDER — PEN NEEDLE, DIABETIC 30 GX3/16"
NEEDLE, DISPOSABLE MISCELLANEOUS
Qty: 1 PACKAGE | Refills: 11 | Status: SHIPPED | OUTPATIENT
Start: 2018-11-20 | End: 2021-05-01

## 2018-11-20 RX ORDER — INSULIN PUMP SYRINGE, 3 ML
EACH MISCELLANEOUS
COMMUNITY
End: 2021-05-01

## 2018-11-20 NOTE — PROGRESS NOTES
Chief Complaint Patient presents with  Diabetes Follow up  Medication Refill Patient has his meter today. Patient states that he needs medication refill(s) on Zenpep.

## 2018-11-20 NOTE — PROGRESS NOTES
Internal Medicine Follow Up Visit Note Chief Complaint Patient presents with  Diabetes Follow up  Medication Refill HPI:  Finn Gould is a 48 y.o.  male with history significant for DMII, CKD with nephrotic syndrome, chronic pancreatitis is here for the above complaint(s). Diabetes F/U: Followed by Dr. Christiano Acosta. Medications include lantus 75 units daily and novolog SSI. Medication compliance good. Diet and exercise discussed, working on low sugar diet. . Reports home fasting sugars 80s-120s. Some 140s. After eating 170s-200s. HCM due for eye exam 
 
GURPREET on CKD: no urinary symptoms. Some mild swelling in legs. No shortness of breath. No chest pain or palpitations. Needs refill of lisinopril, zenpep, syringes and test strips [facility] Current Outpatient Medications Medication Sig  
 fenofibrate nanocrystallized (TRICOR) 48 mg tablet Take 2 Tabs by mouth daily.  OTHER Kratom  gabapentin (NEURONTIN) 300 mg capsule Take 1 cap nightly for 3 nights then increase to 1 cap PO TID  metoprolol succinate (TOPROL-XL) 25 mg XL tablet Take 1 Tab by mouth daily for 90 days.  furosemide (LASIX) 40 mg tablet Take 1 Tab by mouth daily. Pt may take an extra dose at 1pm if he has increased swelling  potassium chloride (K-DUR, KLOR-CON) 20 mEq tablet Take 1 Tab by mouth daily. Take an extra dose if you take the extra dose of Furosemide  naloxone (NARCAN) 4 mg/actuation nasal spray Use 1 spray intranasally, then discard. Repeat with new spray every 2 min as needed for opioid overdose symptoms, alternating nostrils.  escitalopram oxalate (LEXAPRO) 10 mg tablet Take 10 mg by mouth daily.  lisinopril (PRINIVIL, ZESTRIL) 40 mg tablet Take 40 mg by mouth daily.  LIPASE/PROTEASE/AMYLASE (ZENPEP PO) Take 20,000 Units by mouth three (3) times daily (with meals).  omeprazole (PRILOSEC) 20 mg capsule Take 20 mg by mouth daily.  insulin glargine (LANTUS) 100 unit/mL injection 75 Units by SubCUTAneous route daily. 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  = 4 units 121-160 = 6 units 161-200 = 10 units 201-250 = 12 units 251-300 = 16 units 301-400 = 20 units 401-500 = 30 units 
>501 = 40 units  Indications: sliding scale No current facility-administered medications for this visit. Health Maintenance Topic Date Due  
 EYE EXAM RETINAL OR DILATED Q1  07/24/1975  Pneumococcal 19-64 Highest Risk (1 of 3 - PCV13) 07/24/1984  
 DTaP/Tdap/Td series (1 - Tdap) 07/24/1986  Shingrix Vaccine Age 50> (1 of 2) 07/24/2015  FOBT Q 1 YEAR AGE 50-75  07/24/2015  Influenza Age 5 to Adult  08/01/2018  HEMOGLOBIN A1C Q6M  04/02/2019  MICROALBUMIN Q1  08/22/2019  LIPID PANEL Q1  08/22/2019  
 FOOT EXAM Q1  10/02/2019  Prostate-Specific Antigen  01/09/2020  Hepatitis C Screening  Completed There is no immunization history on file for this patient. Allergies and Medications: Reviewed and updated in EMR. Patient Active Problem List  
Diagnosis Code  Hypertrophy of salivary gland K11.1  Neuralgia, neuritis, and radiculitis, unspecified [id number]  Encounter for long-term (current) use of high-risk medication Z79.899  Chronic pain syndrome G89.4  Chronic pancreatitis ([facility] [address] K86.1  Essential hypertension I10  
 Nephrotic syndrome N04.9  Paroxysmal SVT (supraventricular tachycardia) (HCC) I47.1  Pancreatic insufficiency K86.89  
 HLD (hyperlipidemia) E78.5  GERD (gastroesophageal reflux disease) K21.9  Fatty liver K76.0  Type 1 diabetes mellitus with diabetic neuropathy (HCC) E10.40  Chronic abdominal pain R10.9, G89.29  
 Anxiety F41.9  Health care maintenance Z00.00  Hypermagnesemia E83.41  
  GURPREET (acute kidney injury) ([facility] [address] N17.9  Elevated CK R74.8  Low calcium levels E83.51 Family History, Social History, Past Medical History, Surgical History: Reviewed and updated in EMR as appropriate. OBJECTIVE:  
Visit Vitals /69 Pulse 88 Temp 97.8 °F (36.6 °C) (Oral) Resp 18 Ht 5' 9\" (1.753 m) Wt 247 lb 9.6 oz (112.3 kg) SpO2 98% Comment: ra BMI 36.56 kg/m² BP Readings from Last 3 Encounters:  
11/20/18 138/69  
09/17/18 (!) 166/94  
09/12/18 (!) 189/144 Wt Readings from Last 3 Encounters:  
11/20/18 247 lb 9.6 oz (112.3 kg) 09/17/18 248 lb 3.2 oz (112.6 kg) 09/12/18 242 lb (109.8 kg) General: Pleasant adult man in no acute distress HEENT: Head is atraumatic normo-cephalic. CVS: Heart regular, rate, and rhythm. Audible S1 and S2. PULM: Decreased breath sounds LLL posteriorly, good air movement, no wheezes, rales or rhonchi. EXT: trace pedal edema bilaterally Neuro: Alert and oriented x3. MSE: Mood euthymic, affect congruent and reactive. Nursing Notes Reviewed. LABS/RADIOLOGICAL TESTS: 
 
Lab Results Component Value Date/Time WBC 8.1 08/23/2018 03:51 AM  
 HGB 12.6 08/23/2018 03:51 AM  
 HCT 39.3 08/23/2018 03:51 AM  
 PLATELET 957 63/44/4128 03:51 AM  
 
Lab Results Component Value Date/Time Sodium 132 (L) 11/09/2018 02:14 PM  
 Potassium 6.0 (H) 11/09/2018 02:14 PM  
 Chloride 98 (L) 11/09/2018 02:14 PM  
 CO2 27 11/09/2018 02:14 PM  
 Glucose 246 (H) 11/09/2018 02:14 PM  
 BUN 38 (H) 11/09/2018 02:14 PM  
 Creatinine 1.76 (H) 11/09/2018 02:14 PM  
 
Lab Results Component Value Date/Time Cholesterol, total 224 (H) 08/22/2018 08:02 AM  
 HDL Cholesterol 27 (L) 08/22/2018 08:02 AM  
 LDL, calculated 126 08/22/2018 08:02 AM  
 Triglyceride 356 (H) 08/22/2018 08:02 AM  
 
8/22/2018  8:44 AM - Luis Miguel, [facility] Lab In Component Value Flag Ref Range Units Status Hemoglobin A1c 7.6 Abnormally high   H  4.2 - 6.3 % Final  
 
 
 All lab results and radiological studies were reviewed and discussed with the patient. ASSESSMENT/PLAN:   
  ICD-10-CM ICD-9-CM 1. Type 1 diabetes mellitus with diabetic neuropathy (HCC) E10.40 250.61 Insulin Syringe-Needle U-100 (INSULIN SYRINGE MICROFINE) 1 mL 27 gauge x 5/8\" syrg  
  357.2 Insulin Needles, Disposable, 31 gauge x 5/16\" ndle 2. Essential hypertension I10 401.9 Continue current mgmt 3. Idiopathic chronic pancreatitis (HCC) K86.1 577.1 Continue current mgmt 4. GURPREET (acute kidney injury) ([facility] [address] N17.9 584.9 RENAL FUNCTION PANEL  
   XR CHEST PA LAT 5. Nephrotic syndrome N04.9 581.9 XR CHEST PA LAT 6. Pancreatic insufficiency K86.89 577.8 Continue current mgmt Requested Prescriptions Signed Prescriptions Disp Refills  Insulin Syringe-Needle U-100 (INSULIN SYRINGE MICROFINE) 1 mL 27 gauge x 5/8\" syrg 100 Each 11 Sig: Use for insulin dosing QID  Insulin Needles, Disposable, 31 gauge x 5/16\" ndle 1 Package 11 Sig: QID insulin dosing SC Patient verbalized understanding and agreement with the plan. Patient was given an after-visit summary. Follow-up Disposition: 
Return in about 3 weeks (around 12/11/2018), or if symptoms worsen or fail to improve, for GURPREET, CKD. or sooner if worsening symptoms. More than 50% of this 40 min visit was spent counseling the patient face to face about etiology and treatment of health conditions outlined in assessment and plan. Nga Mendez M.D. [address], [address] [facility], [address] [place] [address] [phone] Fax - [phone] or [phone]

## 2018-11-20 NOTE — PROGRESS NOTES
Chief Complaint Patient presents with  Diabetes Follow up  Medication Refill Patient has his meter today. Patient states that he needs medication refill(s).

## 2018-11-20 NOTE — PATIENT INSTRUCTIONS
Continue with current medications Increase water intake goal 2 liters per day max fluid Continue all other medications Return Friday for blood work and chest xray, schedule with , recommend flu shot and pneumonia shot (PPSV23) Schedule eye exam for diabetes retinopathy screening FOR WEIGHT: 
Continue to work on low sugar/carbohydrate diet for weight loss Exercise 30 minutes a day 4-5 days week Learning About Low-Carbohydrate Diets for Weight Loss What is a low-carbohydrate diet? Low-carb diets avoid foods that are high in carbohydrate. These high-carb foods include pasta, bread, rice, cereal, fruits, and starchy vegetables. Instead, these diets usually have you eat foods that are high in fat and protein. Many people lose weight quickly on a low-carb diet. But the early weight loss is water. People on this diet often gain the weight back after they start eating carbs again. Not all diet plans are safe or work well. A lot of the evidence shows that low-carb diets aren't healthy. That's because these diets often don't include healthy foods like fruits and vegetables. Losing weight safely means balancing protein, fat, and carbs with every meal and snack. And low-carb diets don't always provide the vitamins, minerals, and fiber you need. If you have a serious medical condition, talk to your doctor before you try any diet. These conditions include kidney disease, heart disease, type 2 diabetes, high cholesterol, and high blood pressure. If you are pregnant, it may not be safe for your baby if you are on a low-carb diet. How can you lose weight safely? You might have heard that a diet plan helped another person lose weight. But that doesn't mean that it will work for you. It is very hard to stay on a diet that includes lots of big changes in your eating habits. If you want to get to a healthy weight and stay there, making healthy lifestyle changes will often work better than dieting. These steps can help. · Make a plan for change. Work with your doctor to create a plan that is right for you. · See a dietitian. He or she can show you how to make healthy changes in your eating habits. · Manage stress. If you have a lot of stress in your life, it can be hard to focus on making healthy changes to your daily habits. · Track your food and activity. You are likely to do better at losing weight if you keep track of what you eat and what you do. Follow-up care is a key part of your treatment and safety. Be sure to make and go to all appointments, and call your doctor if you are having problems. It's also a good idea to know your test results and keep a list of the medicines you take. Where can you learn more? Go to http://anaid-manjit.info/. Enter A121 in the search box to learn more about \"Learning About Low-Carbohydrate Diets for Weight Loss. \" Current as of: December 8, 2016 Content Version: 11.3 © 0683-9298 [employer]. Care instructions adapted under license by [employer] (which disclaims liability or warranty for this information). If you have questions about a medical condition or this instruction, always ask your healthcare professional. [address] any warranty or liability for your use of this information. Body Mass Index: Care Instructions Your Care Instructions Body mass index (BMI) can help you see if your weight is raising your risk for health problems. It uses a formula to compare how much you weigh with how tall you are. · A BMI lower than 18.5 is considered underweight. · A BMI between 18.5 and 24.9 is considered healthy. · A BMI between 25 and 29.9 is considered overweight. A BMI of 30 or higher is considered obese. If your BMI is in the normal range, it means that you have a lower risk for weight-related health problems.  If your BMI is in the overweight or obese range, you may be at increased risk for weight-related health problems, such as high blood pressure, heart disease, stroke, arthritis or joint pain, and diabetes. If your BMI is in the underweight range, you may be at increased risk for health problems such as fatigue, lower protection (immunity) against illness, muscle loss, bone loss, hair loss, and hormone problems. BMI is just one measure of your risk for weight-related health problems. You may be at higher risk for health problems if you are not active, you eat an unhealthy diet, or you drink too much alcohol or use tobacco products. Follow-up care is a key part of your treatment and safety. Be sure to make and go to all appointments, and call your doctor if you are having problems. It's also a good idea to know your test results and keep a list of the medicines you take. How can you care for yourself at home? · Practice healthy eating habits. This includes eating plenty of fruits, vegetables, whole grains, lean protein, and low-fat dairy. · If your doctor recommends it, get more exercise. Walking is a good choice. Bit by bit, increase the amount you walk every day. Try for at least 30 minutes on most days of the week. · Do not smoke. Smoking can increase your risk for health problems. If you need help quitting, talk to your doctor about stop-smoking programs and medicines. These can increase your chances of quitting for good. · Limit alcohol to 2 drinks a day for men and 1 drink a day for women. Too much alcohol can cause health problems. If you have a BMI higher than 25 · Your doctor may do other tests to check your risk for weight-related health problems. This may include measuring the distance around your waist. A waist measurement of more than 40 inches in men or 35 inches in women can increase the risk of weight-related health problems.  
· Talk with your doctor about steps you can take to stay healthy or improve your health. You may need to make lifestyle changes to lose weight and stay healthy, such as changing your diet and getting regular exercise. If you have a BMI lower than 18.5 · Your doctor may do other tests to check your risk for health problems. · Talk with your doctor about steps you can take to stay healthy or improve your health. You may need to make lifestyle changes to gain or maintain weight and stay healthy, such as getting more healthy foods in your diet and doing exercises to build muscle. Where can you learn more? Go to http://anaid-manjit.info/. Enter S176 in the search box to learn more about \"Body Mass Index: Care Instructions. \" Current as of: October 13, 2016 Content Version: 11.4 © 5058-1538 [employer]. Care instructions adapted under license by [employer] (which disclaims liability or warranty for this information). If you have questions about a medical condition or this instruction, always ask your healthcare professional. [address] any warranty or liability for your use of this information.

## 2018-11-23 ENCOUNTER — HOSPITAL ENCOUNTER (OUTPATIENT)
Dept: LAB | Age: 53
Discharge: HOME | End: 2018-11-23
Payer: COMMERCIAL

## 2018-11-23 DIAGNOSIS — N17.9 AKI (ACUTE KIDNEY INJURY) (HCC): ICD-10-CM

## 2018-11-23 LAB
ALBUMIN SERPL-MCNC: 3.2 G/DL (ref 3.4–5)
ANION GAP SERPL CALC-SCNC: 4 MMOL/L (ref 3–18)
BUN SERPL-MCNC: 34 MG/DL (ref 7–18)
BUN/CREAT SERPL: 22 (ref 12–20)
CALCIUM SERPL-MCNC: 8.3 MG/DL (ref 8.5–10.1)
CHLORIDE SERPL-SCNC: 99 MMOL/L (ref 100–108)
CO2 SERPL-SCNC: 30 MMOL/L (ref 21–32)
CREAT SERPL-MCNC: 1.57 MG/DL (ref 0.6–1.3)
GLUCOSE SERPL-MCNC: 226 MG/DL (ref 74–99)
PHOSPHATE SERPL-MCNC: 3.6 MG/DL (ref 2.5–4.9)
POTASSIUM SERPL-SCNC: 5 MMOL/L (ref 3.5–5.5)
SODIUM SERPL-SCNC: 133 MMOL/L (ref 136–145)

## 2018-11-23 PROCEDURE — 80069 RENAL FUNCTION PANEL: CPT

## 2018-11-24 RX ORDER — OMEPRAZOLE 20 MG/1
20 CAPSULE, DELAYED RELEASE ORAL DAILY
Qty: 90 CAP | Refills: 1 | Status: SHIPPED | OUTPATIENT
Start: 2018-11-24 | End: 2019-05-23

## 2018-11-24 RX ORDER — ESCITALOPRAM OXALATE 10 MG/1
10 TABLET ORAL DAILY
Qty: 90 TAB | Refills: 1 | Status: SHIPPED | OUTPATIENT
Start: 2018-11-24 | End: 2019-05-07 | Stop reason: SDUPTHER

## 2018-11-24 RX ORDER — FENOFIBRATE 48 MG/1
96 TABLET, COATED ORAL DAILY
Qty: 180 TAB | Refills: 1 | Status: SHIPPED | OUTPATIENT
Start: 2018-11-24 | End: 2019-02-12 | Stop reason: SDUPTHER

## 2018-11-24 RX ORDER — LISINOPRIL 40 MG/1
40 TABLET ORAL DAILY
Qty: 90 TAB | Refills: 1 | Status: SHIPPED | OUTPATIENT
Start: 2018-11-24 | End: 2019-05-07 | Stop reason: SDUPTHER

## 2018-11-24 RX ORDER — METOPROLOL SUCCINATE 25 MG/1
25 TABLET, EXTENDED RELEASE ORAL DAILY
Qty: 90 TAB | Refills: 1 | Status: SHIPPED | OUTPATIENT
Start: 2018-11-24 | End: 2019-03-19 | Stop reason: SDUPTHER

## 2018-11-24 RX ORDER — FUROSEMIDE 40 MG/1
40 TABLET ORAL DAILY
Qty: 180 TAB | Refills: 1 | Status: SHIPPED | OUTPATIENT
Start: 2018-11-24 | End: 2019-04-30 | Stop reason: SDUPTHER

## 2018-11-24 RX ORDER — POTASSIUM CHLORIDE 20 MEQ/1
20 TABLET, EXTENDED RELEASE ORAL DAILY
Qty: 90 TAB | Refills: 1 | Status: SHIPPED | OUTPATIENT
Start: 2018-11-24 | End: 2019-05-07 | Stop reason: SDUPTHER

## 2018-12-10 ENCOUNTER — TELEPHONE (OUTPATIENT)
Dept: FAMILY MEDICINE CLINIC | Age: 53
End: 2018-12-10

## 2018-12-10 NOTE — TELEPHONE ENCOUNTER
Pt came in, thought appt was for today, but it's actually on the 12/12. He also mentioned that he had received the wrong size syringes and Pen Needles. He needs two kinds of insulin syringes: 31 gauge for 30 units and 31 gauge for 100 units. Please call pt.

## 2018-12-11 ENCOUNTER — TELEPHONE (OUTPATIENT)
Dept: FAMILY MEDICINE CLINIC | Age: 53
End: 2018-12-11

## 2018-12-11 NOTE — TELEPHONE ENCOUNTER
LPN please call patient and ask what specific syringes he needs and to what pharmacy he wants us to send [facility]ills[facility]. Need all three measurements including ml, gauge and inches for syringes plus if it is U-100 vs. Half Unit vs. Unit with safety. Which syringe is he using for which insulin and how many times a day? I have 0.3 ml x 31 gauge and ?5/16\". Hard copy hard to read. Is it supposed to be 5/8\" 15/64\" or 5/16\" or a different measurement? Can pend order update pharmacy and send back to MD to sign. Rena Anne M.D.   [address], [address] [place] [address], [address] [place] [address] [phone]  [facility] - [phone]  [phone]

## 2018-12-12 ENCOUNTER — OFFICE VISIT (OUTPATIENT)
Dept: FAMILY MEDICINE CLINIC | Age: 53
End: 2018-12-12

## 2018-12-12 VITALS
WEIGHT: 249.6 LBS | HEART RATE: 81 BPM | HEIGHT: 69 IN | OXYGEN SATURATION: 98 % | TEMPERATURE: 97.5 F | BODY MASS INDEX: 36.97 KG/M2 | RESPIRATION RATE: 18 BRPM | SYSTOLIC BLOOD PRESSURE: 150 MMHG | DIASTOLIC BLOOD PRESSURE: 88 MMHG

## 2018-12-12 DIAGNOSIS — N04.9 NEPHROTIC SYNDROME: ICD-10-CM

## 2018-12-12 DIAGNOSIS — E10.40 TYPE 1 DIABETES MELLITUS WITH DIABETIC NEUROPATHY (HCC): Primary | ICD-10-CM

## 2018-12-12 PROBLEM — E11.21 TYPE 2 DIABETES WITH NEPHROPATHY (HCC): Status: ACTIVE | Noted: 2018-12-12

## 2018-12-12 PROBLEM — E66.01 SEVERE OBESITY (HCC): Status: ACTIVE | Noted: 2018-12-12

## 2018-12-12 RX ORDER — CALCIUM CARB/VITAMIN D3/VIT K1 500-100-40
TABLET,CHEWABLE ORAL
Qty: 100 SYRINGE | Refills: 11 | Status: SHIPPED | OUTPATIENT
Start: 2018-12-12 | End: 2021-05-01

## 2018-12-12 RX ORDER — CALCIUM CARB/VITAMIN D3/VIT K1 500-100-40
TABLET,CHEWABLE ORAL
Qty: 90 SYRINGE | Refills: 11 | Status: SHIPPED | OUTPATIENT
Start: 2018-12-12 | End: 2021-05-01

## 2018-12-12 NOTE — PROGRESS NOTES
Chief Complaint   Patient presents with    Other     GURPREET    Other     CKD      1. Have you been to the ER, urgent care clinic since your last visit? Hospitalized since your last visit? No    2. Have you seen or consulted any other health care providers outside of the [address] [place] [address] since your last visit? Include any pap smears or colon screening.  No

## 2018-12-12 NOTE — PROGRESS NOTES
Internal Medicine Follow Up Visit Note    Chief Complaint   Patient presents with    Chronic Kidney Disease     GURPREET    Medication Refill       HPI:  Tono Rice is a 48 y.o.  male with history significant for DMII, CKD with nephrotic syndrome, chronic pancreatitis is here for the above complaint(s). Last seen 11/20/2018. Diabetes F/U: Followed by Dr. Gonzalez Villanueva. Medications include lantus 75 units daily and novolog SSI. Medication compliance good. Diet and exercise discussed, working on low sugar diet. . Reports home fasting sugars 80s-120s. Some 140s. After eating 170s-200s. HCM due for eye exam, plans to schedule with [employer] [employer]. Needs insulin syringes refilled.     GURPREET on CKD: Improved after last visit. Followed by nephrology, last visit 9/2018, has appointment scheduled for later this month.         Current Outpatient Medications   Medication Sig    Insulin Syringe-Needle U-100 (ADVOCATE SYRINGES) 0.3 mL 31 gauge x 5/16\" syrg For insulin dosing TID    Insulin Syringe-Needle U-100 (ADVOCATE SYRINGES) 1 mL 31 gauge x 5/16 syrg Using for insulin dosing once a day    metoprolol succinate (TOPROL-XL) 25 mg XL tablet Take 1 Tab by mouth daily for 180 days.  fenofibrate nanocrystallized (TRICOR) 48 mg tablet Take 2 Tabs by mouth daily for 180 days.  omeprazole (PRILOSEC) 20 mg capsule Take 1 Cap by mouth daily for 180 days.  lisinopril (PRINIVIL, ZESTRIL) 40 mg tablet Take 1 Tab by mouth daily for 180 days.  lipase-protease-amylase (ZENPEP) 20,000-68,000 -109,000 unit capsule Take 1 Cap by mouth three (3) times daily (with meals) for 180 days.  furosemide (LASIX) 40 mg tablet Take 1 Tab by mouth daily for 180 days. Pt may take an extra dose at 1pm if he has increased swelling    escitalopram oxalate (LEXAPRO) 10 mg tablet Take 1 Tab by mouth daily for 180 days.  potassium chloride (K-DUR, KLOR-CON) 20 mEq tablet Take 1 Tab by mouth daily for 180 days.  Take an extra dose if you take the extra dose of Furosemide    Blood-Glucose Meter monitoring kit by Does Not Apply route. One touch ultra mini    Insulin Needles, Disposable, 31 gauge x 5/16\" ndle QID insulin dosing SC    OTHER Kratom    gabapentin (NEURONTIN) 300 mg capsule Take 1 cap nightly for 3 nights then increase to 1 cap PO TID    naloxone (NARCAN) 4 mg/actuation nasal spray Use 1 spray intranasally, then discard. Repeat with new spray every 2 min as needed for opioid overdose symptoms, alternating nostrils.  insulin glargine (LANTUS) 100 unit/mL injection 75 Units by SubCUTAneous route daily. 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  = 4 units  121-160 = 6 units  161-200 = 10 units  201-250 = 12 units  251-300 = 16 units  301-400 = 20 units  401-500 = 30 units  >501 = 40 units  Indications: sliding scale     No current facility-administered medications for this visit. Health Maintenance   Topic Date Due    DTaP/Tdap/Td series (1 - Tdap) 07/24/1986    Shingrix Vaccine Age 50> (1 of 2) 07/24/2015    FOBT Q 1 YEAR AGE 50-75  07/24/2015    Influenza Age 9 to Adult  05/26/2019 (Originally 8/1/2018)   [address] OR DILATED  06/12/2019 (Originally 7/24/1975)    Pneumococcal 19-64 Highest Risk (1 of 3 - PCV13) 12/12/2019 (Originally 7/24/1984)    HEMOGLOBIN A1C Q6M  04/02/2019    MICROALBUMIN Q1  08/22/2019    LIPID PANEL Q1  08/22/2019    FOOT EXAM Q1  10/02/2019    Prostate-Specific Antigen  01/09/2020    Hepatitis C Screening  Completed       There is no immunization history on file for this patient. Allergies and Medications: Reviewed and updated in EMR.      Patient Active Problem List   Diagnosis Code    Hypertrophy of salivary gland K11.1    Neuralgia, neuritis, and radiculitis, unspecified [id number]    Encounter for long-term (current) use of high-risk medication Z79.899    Chronic pain syndrome G89.4    Chronic pancreatitis (HCC) K86.1    Essential hypertension I10    Nephrotic syndrome N04.9    Paroxysmal SVT (supraventricular tachycardia) ([facility]) I47.1    Pancreatic insufficiency K86.89    HLD (hyperlipidemia) E78.5    GERD (gastroesophageal reflux disease) K21.9    Fatty liver K76.0    Type 1 diabetes mellitus with diabetic neuropathy (HCC) E10.40    Chronic abdominal pain R10.9, G89.29    Anxiety F41.9    Health care maintenance Z00.00    Hypermagnesemia E83.41    GURPREET (acute kidney injury) ([facility] [address] N17.9    Elevated CK R74.8    Low calcium levels E83.51    Type 2 diabetes with nephropathy (HCC) E11.21    Severe obesity (HCC) E66.01       Family History, Social History, Past Medical History, Surgical History: Reviewed and updated in EMR as appropriate. OBJECTIVE:   Visit Vitals  /88   Pulse 81   Temp 97.5 °F (36.4 °C) (Oral)   Resp 18   Ht 5' 9\" (1.753 m)   Wt 249 lb 9.6 oz (113.2 kg)   SpO2 98% Comment: RA   BMI 36.86 kg/m²        BP Readings from Last 3 Encounters:   12/12/18 150/88   11/20/18 138/69   09/17/18 (!) 166/94     Wt Readings from Last 3 Encounters:   12/12/18 249 lb 9.6 oz (113.2 kg)   11/20/18 247 lb 9.6 oz (112.3 kg)   09/17/18 248 lb 3.2 oz (112.6 kg)       General: Pleasant adult man in no acute distress  HEENT: Head is atraumatic normo-cephalic. Neuro: Alert and oriented x3. Gait wnl  MSE: mood euthymic, affect congruent and reactive. Nursing Notes Reviewed.     LABS/RADIOLOGICAL TESTS:    Lab Results   Component Value Date/Time    WBC 8.1 08/23/2018 03:51 AM    HGB 12.6 08/23/2018 03:51 AM    HCT 39.3 08/23/2018 03:51 AM    PLATELET 393 43/31/4289 03:51 AM       11/9/2018  7:44 PM - Luis Miguel, Lab In [facility]   Component Value Flag Ref Range Units Status   Sodium 132 Abnormally low   L  136 - 145 mmol/L Final   Potassium 6.0 Abnormally high   H  3.5 - 5.5 mmol/L Final   Chloride 98 Abnormally low   L  100 - 108 mmol/L Final   CO2 27   21 - 32 mmol/L Final   Anion gap 7   3.0 - 18 mmol/L Final   Glucose 246 Abnormally high   H  74 - 99 mg/dL Final   BUN 38 Abnormally high   H  7.0 - 18 MG/DL Final   Creatinine 1.76 Abnormally high   H  0.6 - 1.3 MG/DL Final   BUN/Creatinine ratio 22 Abnormally high   H  12 - 20   Final   GFR est AA 49 Abnormally low   L  >60 ml/min/1.73m2 Final   GFR est non-AA 41 Abnormally low   L  >60 ml/min/1.73m2 Final   Comment:   Calcium 8.3 Abnormally low   L  8.5 - 10.1 MG/DL Final   Phosphorus 3.5   2.5 - 4.9 MG/DL Final   Albumin 3.4   3.4 - 5.0 g/dL Final     11/23/2018  6:39 PM - Luis Miguel, Lab In [facility]     Component Value Flag Ref Range Units Status   Sodium 133 Abnormally low   L  136 - 145 mmol/L Final   Potassium 5.0   3.5 - 5.5 mmol/L Final   Chloride 99 Abnormally low   L  100 - 108 mmol/L Final   CO2 30   21 - 32 mmol/L Final   Anion gap 4   3.0 - 18 mmol/L Final   Glucose 226 Abnormally high   H  74 - 99 mg/dL Final   BUN 34 Abnormally high   H  7.0 - 18 MG/DL Final   Creatinine 1.57 Abnormally high   H  0.6 - 1.3 MG/DL Final   BUN/Creatinine ratio 22 Abnormally high   H  12 - 20   Final   GFR est AA 56 Abnormally low   L  >60 ml/min/1.73m2 Final   GFR est non-AA 46 Abnormally low   L  >60 ml/min/1.73m2 Final   Comment:   Calcium 8.3 Abnormally low   L  8.5 - 10.1 MG/DL Final   Phosphorus 3.6   2.5 - 4.9 MG/DL Final   Albumin 3.2 Abnormally low   L  3.4 - 5.0 g/dL Final         All lab results and radiological studies were reviewed and discussed with the patient. ASSESSMENT/PLAN:      ICD-10-CM ICD-9-CM    1. Type 1 diabetes mellitus with diabetic neuropathy (HCC) E10.40 250.61 Insulin Syringe-Needle U-100 (ADVOCATE SYRINGES) 0.3 mL 31 gauge x 5/16\" syrg     357.2 Insulin Syringe-Needle U-100 (ADVOCATE SYRINGES) 1 mL 31 gauge x 5/16 syrg  Continue current mgmt  F/u with endo  Low sugar diet   2.  Nephrotic syndrome N04.9 581.9 F/u with nephrology  Continue current mgmt       Requested Prescriptions     Signed Prescriptions Disp Refills    Insulin Syringe-Needle U-100 (ADVOCATE SYRINGES) 0.3 mL 31 gauge x 5/16\" syrg 100 Syringe 11     Sig: For insulin dosing TID    Insulin Syringe-Needle U-100 (ADVOCATE SYRINGES) 1 mL 31 gauge x 5/16 syrg 90 Syringe 11     Sig: Using for insulin dosing once a day     Patient verbalized understanding and agreement with the plan. Patient was given an after-visit summary. Follow-up Disposition:  Return in about 5 months (around 5/12/2019), or if symptoms worsen or fail to improve, for HTN, DM. or sooner if worsening symptoms. More than 50% of this 25 min visit was spent counseling the patient face to face about etiology and treatment of health conditions outlined in assessment and plan. Kris Natarajan M.D.   [address] -   [facility]   [phone]

## 2018-12-12 NOTE — TELEPHONE ENCOUNTER
Spoke with patient yesterday and patient stated he would bring in all dm supplies on his appt on 12/12/2018.

## 2019-01-02 ENCOUNTER — TELEPHONE (OUTPATIENT)
Dept: FAMILY MEDICINE CLINIC | Age: 54
End: 2019-01-02

## 2019-02-12 DIAGNOSIS — E78.2 MIXED HYPERLIPIDEMIA: ICD-10-CM

## 2019-02-12 DIAGNOSIS — K86.1 IDIOPATHIC CHRONIC PANCREATITIS (HCC): ICD-10-CM

## 2019-02-12 DIAGNOSIS — R10.9 CHRONIC ABDOMINAL PAIN: ICD-10-CM

## 2019-02-12 DIAGNOSIS — G89.29 CHRONIC ABDOMINAL PAIN: ICD-10-CM

## 2019-02-12 NOTE — TELEPHONE ENCOUNTER
Requested Prescriptions     Pending Prescriptions Disp Refills    gabapentin (NEURONTIN) 300 mg capsule 90 Cap 1     Sig: Take 1 cap nightly for 3 nights then increase to 1 cap PO TID    fenofibrate nanocrystallized (TRICOR) 48 mg tablet 180 Tab 1     Sig: Take 2 Tabs by mouth daily for 180 days.

## 2019-02-13 RX ORDER — FENOFIBRATE 48 MG/1
96 TABLET, COATED ORAL DAILY
Qty: 180 TAB | Refills: 1 | Status: SHIPPED | OUTPATIENT
Start: 2019-02-13 | End: 2019-03-19 | Stop reason: SDUPTHER

## 2019-02-13 RX ORDER — GABAPENTIN 300 MG/1
CAPSULE ORAL
Qty: 90 CAP | Refills: 1 | Status: SHIPPED | OUTPATIENT
Start: 2019-02-13 | End: 2019-02-25 | Stop reason: SDUPTHER

## 2019-02-14 ENCOUNTER — TELEPHONE (OUTPATIENT)
Dept: FAMILY MEDICINE CLINIC | Age: 54
End: 2019-02-14

## 2019-02-15 NOTE — TELEPHONE ENCOUNTER
Spoke with patient and asked patient if he was able to  his medication. Patient stated he was trying to pick it up right now. Both gabapentin and tricor was sent to [place]. Informed patient if he has any problems to call us back. Patient verbalized understanding of conversation.

## 2019-02-18 ENCOUNTER — TELEPHONE (OUTPATIENT)
Dept: FAMILY MEDICINE CLINIC | Age: 54
End: 2019-02-18

## 2019-02-25 ENCOUNTER — TELEPHONE (OUTPATIENT)
Dept: FAMILY MEDICINE CLINIC | Age: 54
End: 2019-02-25

## 2019-02-25 DIAGNOSIS — K86.1 IDIOPATHIC CHRONIC PANCREATITIS (HCC): ICD-10-CM

## 2019-02-25 DIAGNOSIS — G89.29 CHRONIC ABDOMINAL PAIN: ICD-10-CM

## 2019-02-25 DIAGNOSIS — R10.9 CHRONIC ABDOMINAL PAIN: ICD-10-CM

## 2019-02-25 NOTE — TELEPHONE ENCOUNTER
Spoke with serveral people before I was able to start PA. Randy Zuniga will fax over a form to fill out that patient will be able to received due to insurance coverage. Form will be filled out and faxed once provider receives it.

## 2019-02-26 RX ORDER — GABAPENTIN 300 MG/1
CAPSULE ORAL
Qty: 90 CAP | Refills: 1 | Status: SHIPPED | OUTPATIENT
Start: 2019-02-26 | End: 2019-05-22 | Stop reason: SDUPTHER

## 2019-03-19 DIAGNOSIS — I47.1 PAROXYSMAL SVT (SUPRAVENTRICULAR TACHYCARDIA) (HCC): ICD-10-CM

## 2019-03-19 DIAGNOSIS — I10 ESSENTIAL HYPERTENSION: ICD-10-CM

## 2019-03-19 DIAGNOSIS — E78.2 MIXED HYPERLIPIDEMIA: ICD-10-CM

## 2019-03-19 DIAGNOSIS — K86.1 IDIOPATHIC CHRONIC PANCREATITIS (HCC): ICD-10-CM

## 2019-03-19 NOTE — TELEPHONE ENCOUNTER
Requested Prescriptions     Pending Prescriptions Disp Refills    fenofibrate nanocrystallized (TRICOR) 48 mg tablet 180 Tab 1     Sig: Take 2 Tabs by mouth daily for 180 days.  metoprolol succinate (TOPROL-XL) 25 mg XL tablet 90 Tab 1     Sig: Take 1 Tab by mouth daily for 180 days.

## 2019-03-24 RX ORDER — FENOFIBRATE 48 MG/1
96 TABLET, COATED ORAL DAILY
Qty: 180 TAB | Refills: 1 | Status: SHIPPED | OUTPATIENT
Start: 2019-03-24 | End: 2019-09-20

## 2019-03-24 RX ORDER — METOPROLOL SUCCINATE 25 MG/1
25 TABLET, EXTENDED RELEASE ORAL DAILY
Qty: 90 TAB | Refills: 1 | Status: SHIPPED | OUTPATIENT
Start: 2019-03-24 | End: 2019-09-20

## 2019-04-30 DIAGNOSIS — N18.9 CHRONIC KIDNEY DISEASE, UNSPECIFIED CKD STAGE: ICD-10-CM

## 2019-04-30 DIAGNOSIS — N04.9 NEPHROTIC SYNDROME: ICD-10-CM

## 2019-05-01 RX ORDER — FUROSEMIDE 40 MG/1
TABLET ORAL
Qty: 180 TAB | Refills: 1 | Status: ON HOLD | OUTPATIENT
Start: 2019-05-01 | End: 2019-10-03 | Stop reason: SDUPTHER

## 2019-05-07 DIAGNOSIS — N18.9 CHRONIC KIDNEY DISEASE, UNSPECIFIED CKD STAGE: ICD-10-CM

## 2019-05-07 DIAGNOSIS — N04.9 NEPHROTIC SYNDROME: ICD-10-CM

## 2019-05-07 DIAGNOSIS — F32.A ANXIETY AND DEPRESSION: ICD-10-CM

## 2019-05-07 DIAGNOSIS — F41.9 ANXIETY AND DEPRESSION: ICD-10-CM

## 2019-05-07 DIAGNOSIS — I10 ESSENTIAL HYPERTENSION: ICD-10-CM

## 2019-05-07 RX ORDER — OMEPRAZOLE 20 MG/1
CAPSULE, DELAYED RELEASE ORAL
Qty: 90 CAP | Refills: 1 | Status: SHIPPED | OUTPATIENT
Start: 2019-05-07 | End: 2019-05-13

## 2019-05-07 RX ORDER — LISINOPRIL 40 MG/1
TABLET ORAL
Qty: 90 TAB | Refills: 1 | Status: SHIPPED | OUTPATIENT
Start: 2019-05-07 | End: 2019-09-16

## 2019-05-07 RX ORDER — ESCITALOPRAM OXALATE 10 MG/1
TABLET ORAL
Qty: 90 TAB | Refills: 1 | Status: SHIPPED | OUTPATIENT
Start: 2019-05-07 | End: 2019-09-23 | Stop reason: DRUGHIGH

## 2019-05-07 RX ORDER — POTASSIUM CHLORIDE 20 MEQ/1
TABLET, EXTENDED RELEASE ORAL
Qty: 180 TAB | Refills: 1 | Status: SHIPPED | OUTPATIENT
Start: 2019-05-07 | End: 2020-09-20

## 2019-05-13 ENCOUNTER — OFFICE VISIT (OUTPATIENT)
Dept: FAMILY MEDICINE CLINIC | Age: 54
End: 2019-05-13

## 2019-05-13 VITALS
RESPIRATION RATE: 16 BRPM | DIASTOLIC BLOOD PRESSURE: 80 MMHG | HEART RATE: 73 BPM | TEMPERATURE: 98 F | HEIGHT: 69 IN | OXYGEN SATURATION: 93 % | WEIGHT: 271.4 LBS | SYSTOLIC BLOOD PRESSURE: 153 MMHG | BODY MASS INDEX: 40.2 KG/M2

## 2019-05-13 DIAGNOSIS — N04.9 NEPHROTIC SYNDROME: ICD-10-CM

## 2019-05-13 DIAGNOSIS — Z79.4 CONTROLLED TYPE 2 DIABETES MELLITUS WITH OTHER SPECIFIED COMPLICATION, WITH LONG-TERM CURRENT USE OF INSULIN (HCC): ICD-10-CM

## 2019-05-13 DIAGNOSIS — E11.69 CONTROLLED TYPE 2 DIABETES MELLITUS WITH OTHER SPECIFIED COMPLICATION, WITH LONG-TERM CURRENT USE OF INSULIN (HCC): ICD-10-CM

## 2019-05-13 DIAGNOSIS — N18.9 CHRONIC KIDNEY DISEASE, UNSPECIFIED CKD STAGE: Primary | ICD-10-CM

## 2019-05-13 DIAGNOSIS — E66.01 SEVERE OBESITY (HCC): ICD-10-CM

## 2019-05-13 DIAGNOSIS — M72.2 PLANTAR FASCIITIS, BILATERAL: ICD-10-CM

## 2019-05-13 RX ORDER — INSULIN GLARGINE 100 [IU]/ML
70 INJECTION, SOLUTION SUBCUTANEOUS
Status: ON HOLD | COMMUNITY
Start: 2019-02-12 | End: 2021-05-01 | Stop reason: SDUPTHER

## 2019-05-13 NOTE — PROGRESS NOTES
Chief Complaint   Patient presents with    Follow-up     1. Have you been to the ER, urgent care clinic since your last visit? Hospitalized since your last visit? No    2. Have you seen or consulted any other health care providers outside of the [address] [place] [address] since your last visit? Include any pap smears or colon screening.  No

## 2019-05-13 NOTE — PATIENT INSTRUCTIONS
FOR SWELLING:  TAKE LASIX 60 MG TWICE A DAY FOR 4 DAYS THEN GO BACK TO 40 MG TWICE A DAY UNTIL YOU CAN SEE KIDNEY SPECIALIST  STOP ALL SALT IN DIET  FLUID MAX 64 OUNCES ALL FLUIDS, SHOULD BE MOSTLY WATER    FOR FEET:   USE TYLENOL 500 MG TWICE A DAY FOR PAIN AS NEEDED  DO BELOW EXERCO     Plantar Fasciitis: Exercises  Your Care Instructions  Here are some examples of typical rehabilitation exercises for your condition. Start each exercise slowly. Ease off the exercise if you start to have pain. Your doctor or physical therapist will tell you when you can start these exercises and which ones will work best for you. How to do the exercises  Towel stretch    1. Sit with your legs extended and knees straight. 2. Place a towel around your foot just under the toes. 3. Hold each end of the towel in each hand, with your hands above your knees. 4. Pull back with the towel so that your foot stretches toward you. 5. Hold the position for at least 15 to 30 seconds. 6. Repeat 2 to 4 times a session, up to 5 sessions a day. Calf stretch    1. Stand facing a wall with your hands on the wall at about eye level. Put the leg you want to stretch about a step behind your other leg. 2. Keeping your back heel on the floor, bend your front knee until you feel a stretch in the back leg. 3. Hold the stretch for 15 to 30 seconds. Repeat 2 to 4 times. Plantar fascia and calf stretch    1. Stand on a step as shown above. Be sure to hold on to the banister. 2. Slowly let your heels down over the edge of the step as you relax your calf muscles. You should feel a gentle stretch across the bottom of your foot and up the back of your leg to your knee. 3. Hold the stretch about 15 to 30 seconds, and then tighten your calf muscle a little to bring your heel back up to the level of the step. Repeat 2 to 4 times. Towel curls    1.  While sitting, place your foot on a towel on the floor and scrunch the towel toward you with your toes.  2. Then, also using your toes, push the towel away from you. [place] pickups    1. Put marbles on the floor next to a cup.  2. Using your toes, try to lift the marbles up from the floor and put them in the cup. Follow-up care is a key part of your treatment and safety. Be sure to make and go to all appointments, and call your doctor if you are having problems. It's also a good idea to know your test results and keep a list of the medicines you take. Where can you learn more? Go to http://anaid-manjit.info/. Tutu Gandara in the search box to learn more about \"Plantar Fasciitis: Exercises. \"  Current as of: September 20, 2018  Content Version: 11.9  © 0094-4554 [employer]. Care instructions adapted under license by [employer] (which disclaims liability or warranty for this information). If you have questions about a medical condition or this instruction, always ask your healthcare professional. [place] [address] any warranty or liability for your use of this information.     ISES  USE ICE AT NIGHT, FROZEN WATER BOTTLE CAN BE HELPFUL TO ROLL ON FOOT WHEN PAINFUL

## 2019-05-13 NOTE — PROGRESS NOTES
Internal Medicine Follow Up Visit Note    Chief Complaint   Patient presents with    Follow-up       HPI:  Supa Hubbard is a 48 y.o.  male with history significant for DMII, CKD with nephrotic syndrome, chronic pancreatitis is here for the above complaint(s). Feet pain; started years ago off and on, now more consistent for past moth. Feet pain on bottom of right>left. Dull aching pain, constant, worse with walking, better with rest. Feels like \"stepping on something. \"     Hypertension: Today BP is uncontrolled. Taking all medicines as prescribed. Ate holman for lunch today, otherwise working on low salt diet. Nephrotic syndrome: continues to see nephrologist. Not adhering to fuid restriction, working on salt restriction. Has increased lasix to 40 mg twice a day for 2 weeks with minimal Improvement in leg swelling but some improvement. Current Outpatient Medications   Medication Sig    BASAGLAR KWIKPEN U-100 INSULIN 100 unit/mL (3 mL) inpn 75 Units by SubCUTAneous route nightly.  lisinopril (PRINIVIL, ZESTRIL) 40 mg tablet TAKE 1 TABLET DAILY    potassium chloride (K-DUR, KLOR-CON) 20 mEq tablet TAKE 1 TABLET DAILY, TAKE  AN EXTRA DOSE IF YOU TAKE  THE EXTRA DOSE OF  FUROSEMIDE    escitalopram oxalate (LEXAPRO) 10 mg tablet TAKE 1 TABLET DAILY    furosemide (LASIX) 40 mg tablet TAKE 1 TABLET DAILY, MAY  TAKE AN EXTRA DOSE AT  1:00PM IF YOU HAVE  INCREASED SWELLING    fenofibrate nanocrystallized (TRICOR) 48 mg tablet Take 2 Tabs by mouth daily for 180 days.  metoprolol succinate (TOPROL-XL) 25 mg XL tablet Take 1 Tab by mouth daily for 180 days.     gabapentin (NEURONTIN) 300 mg capsule Take 1 cap nightly for 3 nights then increase to 1 cap PO TID    Insulin Syringe-Needle U-100 (ADVOCATE SYRINGES) 0.3 mL 31 gauge x 5/16\" syrg For insulin dosing TID    Insulin Syringe-Needle U-100 (ADVOCATE SYRINGES) 1 mL 31 gauge x 5/16 syrg Using for insulin dosing once a day    omeprazole (PRILOSEC) 20 mg capsule Take 1 Cap by mouth daily for 180 days.  lipase-protease-amylase (ZENPEP) 20,000-68,000 -109,000 unit capsule Take 1 Cap by mouth three (3) times daily (with meals) for 180 days.  Insulin Needles, Disposable, 31 gauge x 5/16\" ndle QID insulin dosing SC    INSULIN ASPART (NOVOLOG SC) by SubCUTAneous route Before breakfast, lunch, and dinner.  = 4 units  121-160 = 6 units  161-200 = 10 units  201-250 = 12 units  251-300 = 16 units  301-400 = 20 units  401-500 = 30 units  >501 = 40 units  Indications: sliding scale    Blood-Glucose Meter monitoring kit by Does Not Apply route. One touch ultra mini    OTHER Kratom    naloxone (NARCAN) 4 mg/actuation nasal spray Use 1 spray intranasally, then discard. Repeat with new spray every 2 min as needed for opioid overdose symptoms, alternating nostrils. No current facility-administered medications for this visit. Health Maintenance   Topic Date Due    Pneumococcal 0-64 years (1 of 3 - PCV13) 07/24/1971    DTaP/Tdap/Td series (1 - Tdap) 07/24/1986    Shingrix Vaccine Age 50> (1 of 2) 07/24/2015    FOBT Q 1 YEAR AGE 50-75  07/24/2015    HEMOGLOBIN A1C Q6M  04/02/2019    EYE EXAM RETINAL OR DILATED  06/12/2019 (Originally 7/24/1975)    Influenza Age 9 to Adult  08/01/2019    MICROALBUMIN Q1  08/22/2019    LIPID PANEL Q1  08/22/2019    FOOT EXAM Q1  10/02/2019    Prostate-Specific Antigen  01/09/2020    Hepatitis C Screening  Completed       There is no immunization history on file for this patient. Allergies and Medications: Reviewed and updated in EMR.      Patient Active Problem List   Diagnosis Code    Hypertrophy of salivary gland K11.1    Neuralgia, neuritis, and radiculitis, unspecified [id number]    Encounter for long-term (current) use of high-risk medication Z79.899    Chronic pain syndrome G89.4    Chronic pancreatitis ([facility] [address] K86.1    Essential hypertension I10    Nephrotic syndrome N04.9    Paroxysmal SVT (supraventricular tachycardia) ([facility]) I47.1    Pancreatic insufficiency K86.89    HLD (hyperlipidemia) E78.5    GERD (gastroesophageal reflux disease) K21.9    Fatty liver K76.0    Type 1 diabetes mellitus with diabetic neuropathy ([facility]) E10.40    Chronic abdominal pain R10.9, G89.29    Anxiety F41.9    Health care maintenance Z00.00    Hypermagnesemia E83.41    GURPREET (acute kidney injury) ([facility] [address] N17.9    Elevated CK R74.8    Low calcium levels E83.51    Type 2 diabetes with nephropathy (HCC) E11.21    Severe obesity ([facility]) E66.01    Plantar fasciitis, bilateral M72.2       Family History, Social History, Past Medical History, Surgical History: Reviewed and updated in EMR as appropriate. OBJECTIVE:   Visit Vitals  /80   Pulse 73   Temp 98 °F (36.7 °C) (Oral)   Resp 16   Ht 5' 9\" (1.753 m)   Wt 271 lb 6.4 oz (123.1 kg)   SpO2 93%   BMI 40.08 kg/m²        BP Readings from Last 3 Encounters:   05/13/19 153/80   12/12/18 150/88   11/20/18 138/69     Wt Readings from Last 3 Encounters:   05/13/19 271 lb 6.4 oz (123.1 kg)   12/12/18 249 lb 9.6 oz (113.2 kg)   11/20/18 247 lb 9.6 oz (112.3 kg)       General: Pleasant adult man in no acute distress  HEENT: Head is atraumatic normo-cephalic. CVS: . Heart regular, rate, and rhythm. Audible S1 and S2. No murmurs, rubs or gallops. PULM: Lungs clear to auscultation bilaterally. No wheezes, rales or rhonchi. GI: Positive bowel sounds, soft, non-distended, non-tender. EXT: 2+ dorsalis pedis pulses bilaterally. Trace to 1+ pitting edema bilateral LE. Neuro: Alert and oriented x3. MSK: +TTP plantar fascii insertion bilateral feet with dorsiflexion. stregnth ankle 5/5. Toe amputations distally right foot first and second digit. Nursing Notes Reviewed.     Lab Results   Component Value Date/Time    Sodium 133 (L) 11/23/2018 12:00 PM    Potassium 5.0 11/23/2018 12:00 PM    Chloride 99 (L) 11/23/2018 12:00 PM    CO2 30 11/23/2018 12:00 PM    Glucose 226 (H) 11/23/2018 12:00 PM    BUN 34 (H) 11/23/2018 12:00 PM    Creatinine 1.57 (H) 11/23/2018 12:00 PM     All lab results and radiological studies were reviewed and discussed with the patient. ASSESSMENT/PLAN:      ICD-10-CM ICD-9-CM    1. Chronic kidney disease, unspecified CKD stage N18.9 585.9 F/u with nephrology   2. Nephrotic syndrome N04.9 581.9 Increase lasix to 60 mg twice a day for 4 days, if no improvement in swelling or worsening swelling encouraged to report to local hospital for IV diuresis  Fluid restriction 2 liters, salt 1800 mg   3. Controlled type 2 diabetes mellitus with other specified complication, with long-term current use of insulin ([facility]) E11.69 250.80 Continue current mgmt  F/u with endocrinology    Z79.4 V58.67    4. Severe obesity ([facility]) E66.01 278.01 Diet and exercise plan discussed  BMI education provided   5. Plantar fasciitis, bilateral M72.2 728.71 Education provided  apap prn       Requested Prescriptions      No prescriptions requested or ordered in this encounter     Patient verbalized understanding and agreement with the plan. Patient was given an after-visit summary. Follow-up and Dispositions    · Return in about 3 months (around 8/13/2019) for ESTABLISH CARE. or sooner if worsening symptoms. More than 50% of this 40 min visit was spent counseling the patient face to face about etiology and treatment of health conditions outlined in assessment and plan. Briana Castorena M.D.   [employer]  [address] [place] [address], [address] [place] [address]   [facility]   [phone]

## 2019-05-15 ENCOUNTER — TELEPHONE (OUTPATIENT)
Dept: FAMILY MEDICINE CLINIC | Age: 54
End: 2019-05-15

## 2019-05-15 NOTE — TELEPHONE ENCOUNTER
Just started higher dose diuretic yesterday, no improvement yet. Understands report to hospital precautions. Letter written, pt will  tomorrow. Hillary Eastman M.D.   [address], [address] [place] [address] [phone]  [phone]

## 2019-05-15 NOTE — TELEPHONE ENCOUNTER
Pt called and requested a doctor's note for today through Friday. He stated he has worked since his appt on 5/13, however he feels he needs to recuperate now.  Please call pt back when letter is ready,

## 2019-05-22 ENCOUNTER — TELEPHONE (OUTPATIENT)
Dept: FAMILY MEDICINE CLINIC | Age: 54
End: 2019-05-22

## 2019-05-22 ENCOUNTER — OFFICE VISIT (OUTPATIENT)
Dept: FAMILY MEDICINE CLINIC | Age: 54
End: 2019-05-22

## 2019-05-22 ENCOUNTER — LAB ONLY (OUTPATIENT)
Dept: FAMILY MEDICINE CLINIC | Age: 54
End: 2019-05-22

## 2019-05-22 ENCOUNTER — HOSPITAL ENCOUNTER (OUTPATIENT)
Dept: LAB | Age: 54
Discharge: HOME | End: 2019-05-22
Payer: COMMERCIAL

## 2019-05-22 VITALS
BODY MASS INDEX: 38.36 KG/M2 | RESPIRATION RATE: 18 BRPM | WEIGHT: 259 LBS | SYSTOLIC BLOOD PRESSURE: 151 MMHG | TEMPERATURE: 98.5 F | HEIGHT: 69 IN | OXYGEN SATURATION: 97 % | DIASTOLIC BLOOD PRESSURE: 83 MMHG | HEART RATE: 64 BPM

## 2019-05-22 DIAGNOSIS — N04.9 NEPHROTIC SYNDROME: Primary | ICD-10-CM

## 2019-05-22 DIAGNOSIS — N04.9 NEPHROTIC SYNDROME: ICD-10-CM

## 2019-05-22 DIAGNOSIS — N18.9 CHRONIC KIDNEY DISEASE, UNSPECIFIED CKD STAGE: Primary | ICD-10-CM

## 2019-05-22 DIAGNOSIS — N18.9 CHRONIC KIDNEY DISEASE, UNSPECIFIED CKD STAGE: ICD-10-CM

## 2019-05-22 DIAGNOSIS — Z01.89 ROUTINE LAB DRAW: Primary | ICD-10-CM

## 2019-05-22 DIAGNOSIS — N17.9 AKI (ACUTE KIDNEY INJURY) (HCC): ICD-10-CM

## 2019-05-22 DIAGNOSIS — I10 ESSENTIAL HYPERTENSION: ICD-10-CM

## 2019-05-22 DIAGNOSIS — G89.29 CHRONIC ABDOMINAL PAIN: ICD-10-CM

## 2019-05-22 DIAGNOSIS — R60.0 LEG EDEMA: ICD-10-CM

## 2019-05-22 DIAGNOSIS — K86.1 IDIOPATHIC CHRONIC PANCREATITIS (HCC): ICD-10-CM

## 2019-05-22 DIAGNOSIS — R10.9 CHRONIC ABDOMINAL PAIN: ICD-10-CM

## 2019-05-22 LAB
ALBUMIN SERPL-MCNC: 3.2 G/DL (ref 3.4–5)
ANION GAP SERPL CALC-SCNC: 8 MMOL/L (ref 3–18)
BUN SERPL-MCNC: 31 MG/DL (ref 7–18)
BUN/CREAT SERPL: 17 (ref 12–20)
CALCIUM SERPL-MCNC: 8.7 MG/DL (ref 8.5–10.1)
CHLORIDE SERPL-SCNC: 97 MMOL/L (ref 100–108)
CO2 SERPL-SCNC: 31 MMOL/L (ref 21–32)
CREAT SERPL-MCNC: 1.8 MG/DL (ref 0.6–1.3)
GLUCOSE SERPL-MCNC: 125 MG/DL (ref 74–99)
PHOSPHATE SERPL-MCNC: 4.2 MG/DL (ref 2.5–4.9)
POTASSIUM SERPL-SCNC: 5.6 MMOL/L (ref 3.5–5.5)
SODIUM SERPL-SCNC: 136 MMOL/L (ref 136–145)

## 2019-05-22 PROCEDURE — 80069 RENAL FUNCTION PANEL: CPT

## 2019-05-22 RX ORDER — GABAPENTIN 300 MG/1
300 CAPSULE ORAL 3 TIMES DAILY
Qty: 90 CAP | Refills: 1 | Status: SHIPPED | OUTPATIENT
Start: 2019-05-22 | End: 2019-09-23

## 2019-05-22 RX ORDER — FUROSEMIDE 40 MG/1
60 TABLET ORAL DAILY
Qty: 135 TAB | Refills: 0 | Status: SHIPPED | OUTPATIENT
Start: 2019-05-22 | End: 2019-06-11

## 2019-05-22 NOTE — LETTER
NOTIFICATION RETURN TO WORK 
 
5/22/2019 4:38 PM 
 
[facility] [id number] [phone] To Whom It May Concern: 
 
Maxwell Flores is currently under the care of [address] has been ill and will need to be off work while he recovers. Off work since 5/15/2019 related to this condition. He will return to work/school on: 5/24/2019 When he returns to work will need to be able to intermittently elevate legs for leg swelling. If there are questions or concerns please have the patient contact our office. Sincerely, Pam Jesus MD

## 2019-05-22 NOTE — PATIENT INSTRUCTIONS
FOR SWELLING: 
TAKE LASIX 60 MG TWICE A DAY FOR 4 DAYS THEN GO BACK TO 40 MG TWICE A DAY UNTIL YOU CAN SEE KIDNEY SPECIALIST 
STOP ALL SALT IN DIET 
FLUID MAX 64 OUNCES ALL FLUIDS, SHOULD BE MOSTLY WATER 
 
FOR BLOOD PRESSURE: 
Check blood pressure mid-day 2-3  x per week and write down Make sure you are seated for at least  5-10 minutes, legs uncrossed and back resting Bring log to clinic next visit Max salt per day 2 grams (2000 mg) Leg and Ankle Edema: Care Instructions Your Care Instructions Swelling in the legs, ankles, and feet is called edema. It is common after you sit or stand for a while. Long plane flights or car rides often cause swelling in the legs and feet. You may also have swelling if you have to stand for long periods of time at your job. Problems with the veins in the legs (varicose veins) and changes in hormones can also cause swelling. Sometimes the swelling in the ankles and feet is caused by a more serious problem, such as heart failure, infection, blood clots, or liver or kidney disease. Follow-up care is a key part of your treatment and safety. Be sure to make and go to all appointments, and call your doctor if you are having problems. It's also a good idea to know your test results and keep a list of the medicines you take. How can you care for yourself at home? · If your doctor gave you medicine, take it as prescribed. Call your doctor if you think you are having a problem with your medicine. · Whenever you are resting, raise your legs up. Try to keep the swollen area higher than the level of your heart. · Take breaks from standing or sitting in one position. ? Walk around to increase the blood flow in your lower legs. ? Move your feet and ankles often while you stand, or tighten and relax your leg muscles. · Wear support stockings. Put them on in the morning, before swelling gets worse. · Eat a balanced diet. Lose weight if you need to. · Limit the amount of salt (sodium) in your diet. Salt holds fluid in the body and may increase swelling. When should you call for help? Call 911 anytime you think you may need emergency care. For example, call if: 
  · You have symptoms of a blood clot in your lung (called a pulmonary embolism). These may include: 
? Sudden chest pain. ? Trouble breathing. ? Coughing up blood.  
 Call your doctor now or seek immediate medical care if: 
  · You have signs of a blood clot, such as: 
? Pain in your calf, back of the knee, thigh, or groin. ? Redness and swelling in your leg or groin.  
  · You have symptoms of infection, such as: 
? Increased pain, swelling, warmth, or redness. ? Red streaks or pus. ? A fever.  
 Watch closely for changes in your health, and be sure to contact your doctor if: 
  · Your swelling is getting worse.  
  · You have new or worsening pain in your legs.  
  · You do not get better as expected. Where can you learn more? Go to http://anaid-manjit.info/. Enter X255 in the search box to learn more about \"Leg and Ankle Edema: Care Instructions. \" Current as of: September 23, 2018 Content Version: 11.9 © 9421-6741 [employer]. Care instructions adapted under license by [employer] (which disclaims liability or warranty for this information). If you have questions about a medical condition or this instruction, always ask your healthcare professional. [place] [address] any warranty or liability for your use of this information.

## 2019-05-22 NOTE — PROGRESS NOTES
Sonya Uribe is a 48 y.o. male presents in office for    Chief Complaint   Patient presents with    Form Completion     Doctor's note for 5/20/19 & 5/21/19        Health Maintenance Due   Topic Date Due    Pneumococcal 0-64 years (1 of 3 - PCV13) 07/24/1971    DTaP/Tdap/Td series (1 - Tdap) 07/24/1986    Shingrix Vaccine Age 50> (1 of 2) 07/24/2015    FOBT Q 1 YEAR AGE 50-75  07/24/2015    HEMOGLOBIN A1C Q6M  04/02/2019       1. Have you been to the ER, urgent care clinic since your last visit? Hospitalized since your last visit? No    2. Have you seen or consulted any other health care providers outside of the [address] [place] [address] since your last visit? Include any pap smears or colon screening.  No

## 2019-05-22 NOTE — PROGRESS NOTES
Internal Medicine Follow Up Visit Note    Chief Complaint   Patient presents with    Form Completion     Doctor's note for 5/20/19 & 5/21/19       HPI:  Alba Garza is a 48 y.o.  male with history significant for nephrotic syndrome, CKD followed by nephrology, DMII, followed by endocrinology is here for the above complaint(s). Anasarca/Leg Swelling/CKD: Diagnosed 1 year ago. 1 hospitlizaiton for this about 1 year ago. currenlty Weight down 12 lb since last visit. Taking lasix with help. Leg swelling has improved. Cough started with increased swelling which has also improved recently. No shortness of breath, no orthopnea or PND. Chronic pancreatitis: Diagnosed 14 year ago. typical flares 6-7 x per year, last occurred January 2019 with out of work for 2-3 days, can last as long as one week. 4 x in past 6 months. Anasarca flares intermittent    Current Outpatient Medications   Medication Sig    gabapentin (NEURONTIN) 300 mg capsule Take 1 Cap by mouth three (3) times daily.  furosemide (LASIX) 40 mg tablet Take 1.5 Tabs by mouth daily for 90 days.  BASAGLAR KWIKPEN U-100 INSULIN 100 unit/mL (3 mL) inpn 75 Units by SubCUTAneous route nightly.  lisinopril (PRINIVIL, ZESTRIL) 40 mg tablet TAKE 1 TABLET DAILY    potassium chloride (K-DUR, KLOR-CON) 20 mEq tablet TAKE 1 TABLET DAILY, TAKE  AN EXTRA DOSE IF YOU TAKE  THE EXTRA DOSE OF  FUROSEMIDE    escitalopram oxalate (LEXAPRO) 10 mg tablet TAKE 1 TABLET DAILY    furosemide (LASIX) 40 mg tablet TAKE 1 TABLET DAILY, MAY  TAKE AN EXTRA DOSE AT  1:00PM IF YOU HAVE  INCREASED SWELLING (Patient taking differently: TAKE 1.5 TABLET DAILY, MAY  TAKE AN EXTRA DOSE AT  1:00PM IF YOU HAVE  INCREASED SWELLING)    fenofibrate nanocrystallized (TRICOR) 48 mg tablet Take 2 Tabs by mouth daily for 180 days.  metoprolol succinate (TOPROL-XL) 25 mg XL tablet Take 1 Tab by mouth daily for 180 days.     Insulin Syringe-Needle U-100 (ADVOCATE SYRINGES) 0.3 mL 31 gauge x 5/16\" syrg For insulin dosing TID    Insulin Syringe-Needle U-100 (ADVOCATE SYRINGES) 1 mL 31 gauge x 5/16 syrg Using for insulin dosing once a day    Blood-Glucose Meter monitoring kit by Does Not Apply route. One touch ultra mini    Insulin Needles, Disposable, 31 gauge x 5/16\" ndle QID insulin dosing SC    OTHER Kratom    INSULIN ASPART (NOVOLOG SC) by SubCUTAneous route Before breakfast, lunch, and dinner.  = 4 units  121-160 = 6 units  161-200 = 10 units  201-250 = 12 units  251-300 = 16 units  301-400 = 20 units  401-500 = 30 units  >501 = 40 units  Indications: sliding scale    naloxone (NARCAN) 4 mg/actuation nasal spray Use 1 spray intranasally, then discard. Repeat with new spray every 2 min as needed for opioid overdose symptoms, alternating nostrils. No current facility-administered medications for this visit. Health Maintenance   Topic Date Due    Pneumococcal 0-64 years (1 of 3 - PCV13) 07/24/1971    DTaP/Tdap/Td series (1 - Tdap) 07/24/1986    Shingrix Vaccine Age 50> (1 of 2) 07/24/2015    FOBT Q 1 YEAR AGE 50-75  07/24/2015    HEMOGLOBIN A1C Q6M  04/02/2019    EYE EXAM RETINAL OR DILATED  06/12/2019 (Originally 7/24/1975)    Influenza Age 9 to Adult  08/01/2019    MICROALBUMIN Q1  08/22/2019    LIPID PANEL Q1  08/22/2019    FOOT EXAM Q1  10/02/2019    Prostate-Specific Antigen  01/09/2020    Hepatitis C Screening  Completed       There is no immunization history on file for this patient. Allergies and Medications: Reviewed and updated in EMR.      Patient Active Problem List   Diagnosis Code    Hypertrophy of salivary gland K11.1    Neuralgia, neuritis, and radiculitis, unspecified [id number]    Encounter for long-term (current) use of high-risk medication Z79.899    Chronic pain syndrome G89.4    Chronic pancreatitis ([facility] K86.1    Essential hypertension I10    Nephrotic syndrome N04.9    Paroxysmal SVT (supraventricular tachycardia) ([facility] I47.1    Pancreatic insufficiency K86.89    HLD (hyperlipidemia) E78.5    GERD (gastroesophageal reflux disease) K21.9    Fatty liver K76.0    Type 1 diabetes mellitus with diabetic neuropathy (HCC) E10.40    Chronic abdominal pain R10.9, G89.29    Anxiety F41.9    Health care maintenance Z00.00    Hypermagnesemia E83.41    Elevated CK R74.8    Low calcium levels E83.51    Type 2 diabetes with nephropathy (HCC) E11.21    Severe obesity (HCC) E66.01    Plantar fasciitis, bilateral M72.2       Family History, Social History, Past Medical History, Surgical History: Reviewed and updated in EMR as appropriate. OBJECTIVE:   Visit Vitals  /83 (BP 1 Location: Left arm, BP Patient Position: Sitting)   Pulse 64   Temp 98.5 °F (36.9 °C) (Oral)   Resp 18   Ht 5' 9\" (1.753 m)   Wt 259 lb (117.5 kg)   SpO2 97%   BMI 38.25 kg/m²        BP Readings from Last 3 Encounters:   05/22/19 151/83   05/13/19 153/80   12/12/18 150/88     Wt Readings from Last 3 Encounters:   05/22/19 259 lb (117.5 kg)   05/13/19 271 lb 6.4 oz (123.1 kg)   12/12/18 249 lb 9.6 oz (113.2 kg)       General: Pleasant adult man in no acute distress  HEENT: Head is atraumatic normo-cephalic. CVS: No appreciable elevated JVD. Heart regular, rate, and rhythm. Audible S1 and S2. PULM: Lungs clear to auscultation bilaterally. No wheezes, rales or rhonchi. GI: Positive bowel sounds, soft, nondistended, non-tender. EXT: 2+ dorsalis pedis pulses bilaterally. Pitting edema bilateral LE 1+ to level of upper shin. Neuro: Alert and oriented x3. Nursing Notes Reviewed.     RESULTS:    Lab Results   Component Value Date/Time    WBC 8.1 08/23/2018 03:51 AM    HGB 12.6 08/23/2018 03:51 AM    HCT 39.3 08/23/2018 03:51 AM    PLATELET 831 08/68/5526 03:51 AM     Lab Results   Component Value Date/Time    Sodium 133 (L) 11/23/2018 12:00 PM    Potassium 5.0 11/23/2018 12:00 PM    Chloride 99 (L) 11/23/2018 12:00 PM    CO2 30 11/23/2018 12:00 PM Glucose 226 (H) 11/23/2018 12:00 PM    BUN 34 (H) 11/23/2018 12:00 PM    Creatinine 1.57 (H) 11/23/2018 12:00 PM     Lab Results   Component Value Date/Time    Cholesterol, total 224 (H) 08/22/2018 08:02 AM    HDL Cholesterol 27 (L) 08/22/2018 08:02 AM    LDL, calculated 126 08/22/2018 08:02 AM    Triglyceride 356 (H) 08/22/2018 08:02 AM     All lab results and radiological studies were reviewed and discussed with the patient. ASSESSMENT/PLAN:      ICD-10-CM ICD-9-CM    1. Nephrotic syndrome N04.9 581.9 furosemide (LASIX) 60 mg BID  BMP  Elevate legs  F/u with nephrology  Salt restriction 2000 mg  Fluid restriction 65 ounces per day   2. Idiopathic chronic pancreatitis (HCC) K86.1 577.1 gabapentin (NEURONTIN) 300 mg capsule   3. Chronic abdominal pain R10.9 789.00 gabapentin (NEURONTIN) 300 mg capsule    G89.29 338.29    4. Leg edema R60.0 782.3 furosemide (LASIX) 40 mg tablet   5. Essential hypertension I10 401.9 Continue current mgmt       Requested Prescriptions     Signed Prescriptions Disp Refills    gabapentin (NEURONTIN) 300 mg capsule 90 Cap 1     Sig: Take 1 Cap by mouth three (3) times daily.  furosemide (LASIX) 40 mg tablet 135 Tab 0     Sig: Take 1.5 Tabs by mouth daily for 90 days. Patient verbalized understanding and agreement with the plan. Patient was given an after-visit summary. Follow-up and Dispositions    · Return in about 1 month (around 6/19/2019) for HTN. or sooner if worsening symptoms. More than 50% of this 25 min visit was spent counseling the patient face to face about etiology and treatment of health conditions outlined in assessment and plan. Ovidio Pinto M.D.   [employer]  [address] [place] [address], [address] [place] [address], [address] [place] [address] -   [facility] -   [phone]

## 2019-05-22 NOTE — TELEPHONE ENCOUNTER
Pt will come in for renal function testing prior to appointment today. Vivi Lance M.D.   [address], [address] [place] [address], [address] [place] [address] [phone]  [facility] [phone]

## 2019-05-23 ENCOUNTER — TELEPHONE (OUTPATIENT)
Dept: FAMILY MEDICINE CLINIC | Age: 54
End: 2019-05-23

## 2019-05-23 NOTE — TELEPHONE ENCOUNTER
Called again to discuss lab results. No answer. Left VM for return call and ask to speak with nurse with instructions for medications, if no nurse available will need to leave best time and phone number he can be reached and will attempt return call at that time. Sun Macedo M.D.   [place] [phone]  [facility] -   [phone]

## 2019-05-23 NOTE — TELEPHONE ENCOUNTER
LPN please call patient and inform needs to decrease lasix to 40 mg once a day. Follow up with nephrology as scheduled, get name nephrologist so we can call and request most recent renal function test and notes, please fax this note and most recent blood work, renal function, to nephrology office and call and confirm receipt.

## 2019-05-23 NOTE — TELEPHONE ENCOUNTER
Called to discuss lab results. No answer. No ID on VM. LPN please call patient and inform needs to decrease lasix to 40 mg once a day. Follow up with nephrology as scheduled, get name nephrologist so we can call and request most recent renal function test and notes, please fax this note and most recent blood work, renal function, to nephrology office and call and confirm receipt. Kristen Nelson M.D.   [employer]  [address] [place] [address], [address] [place] [address]   [facility] -   [phone]

## 2019-05-24 ENCOUNTER — TELEPHONE (OUTPATIENT)
Dept: FAMILY MEDICINE CLINIC | Age: 54
End: 2019-05-24

## 2019-05-24 ENCOUNTER — DOCUMENTATION ONLY (OUTPATIENT)
Dept: FAMILY MEDICINE CLINIC | Age: 54
End: 2019-05-24

## 2019-05-24 NOTE — LETTER
5/24/2019 11:45 AM 
 
Mr. Lyles Rojelio [phone] [place] [phone] To Whom It May Concern: 
 
I am writing on behalf of patient, Edilma Chaney, as his primary care doctor. He will need to elevate his legs at least once every 4 hours for at least 30 minutes as needed for leg swelling. Sincerely, Vivi Lance MD

## 2019-05-24 NOTE — TELEPHONE ENCOUNTER
Letter written, please call patient ready for . Paula Slaughter M.D.   [address], [address] [place] [address], [address] [place] [address] -   [facility] -   [phone]

## 2019-05-24 NOTE — TELEPHONE ENCOUNTER
Pt called to let Dr Anirudh Bucio know his job is now requiring a dr's note stating how often he will need to elevate his legs and for how long.

## 2019-06-11 ENCOUNTER — OFFICE VISIT (OUTPATIENT)
Dept: FAMILY MEDICINE CLINIC | Age: 54
End: 2019-06-11

## 2019-06-11 VITALS
HEART RATE: 73 BPM | HEIGHT: 69 IN | DIASTOLIC BLOOD PRESSURE: 65 MMHG | RESPIRATION RATE: 18 BRPM | OXYGEN SATURATION: 97 % | TEMPERATURE: 99.7 F | BODY MASS INDEX: 39.1 KG/M2 | SYSTOLIC BLOOD PRESSURE: 136 MMHG | WEIGHT: 264 LBS

## 2019-06-11 DIAGNOSIS — N04.9 NEPHROTIC SYNDROME: ICD-10-CM

## 2019-06-11 DIAGNOSIS — N17.9 AKI (ACUTE KIDNEY INJURY) (HCC): Primary | ICD-10-CM

## 2019-06-11 DIAGNOSIS — I10 ESSENTIAL HYPERTENSION: ICD-10-CM

## 2019-06-11 NOTE — PROGRESS NOTES
Internal Medicine Follow Up Visit Note    Chief Complaint   Patient presents with    Chronic Kidney Disease       HPI:  Kristina Tucker is a 48 y.o.  male with history significant for nephrotic syndrome, CKD followed by nephrology, DMII, followed by endocrinology  is here for the above complaint(s). GURPREET: decreased lasix to 40 g daily with 40 mg as needed for increased swelling, has used extra dose 2x in past. Improved swelling since last visit. Plans to get blood work today and needs to reschedule with nephology, last seen a few months ago. No fever or chills, no pain with urination or change in urinary frequency. Current Outpatient Medications   Medication Sig    gabapentin (NEURONTIN) 300 mg capsule Take 1 Cap by mouth three (3) times daily.  BASAGLAR KWIKPEN U-100 INSULIN 100 unit/mL (3 mL) inpn 75 Units by SubCUTAneous route nightly.  lisinopril (PRINIVIL, ZESTRIL) 40 mg tablet TAKE 1 TABLET DAILY    potassium chloride (K-DUR, KLOR-CON) 20 mEq tablet TAKE 1 TABLET DAILY, TAKE  AN EXTRA DOSE IF YOU TAKE  THE EXTRA DOSE OF  FUROSEMIDE    escitalopram oxalate (LEXAPRO) 10 mg tablet TAKE 1 TABLET DAILY    furosemide (LASIX) 40 mg tablet TAKE 1 TABLET DAILY, MAY  TAKE AN EXTRA DOSE AT  1:00PM IF YOU HAVE  INCREASED SWELLING (Patient taking differently: TAKE 1.5 TABLET DAILY, MAY  TAKE AN EXTRA DOSE AT  1:00PM IF YOU HAVE  INCREASED SWELLING)    fenofibrate nanocrystallized (TRICOR) 48 mg tablet Take 2 Tabs by mouth daily for 180 days.  metoprolol succinate (TOPROL-XL) 25 mg XL tablet Take 1 Tab by mouth daily for 180 days.  Insulin Syringe-Needle U-100 (ADVOCATE SYRINGES) 0.3 mL 31 gauge x 5/16\" syrg For insulin dosing TID    Insulin Syringe-Needle U-100 (ADVOCATE SYRINGES) 1 mL 31 gauge x 5/16 syrg Using for insulin dosing once a day    Blood-Glucose Meter monitoring kit by Does Not Apply route.  One touch ultra mini    Insulin Needles, Disposable, 31 gauge x 5/16\" ndle QID insulin dosing SC    OTHER Kratom    naloxone (NARCAN) 4 mg/actuation nasal spray Use 1 spray intranasally, then discard. Repeat with new spray every 2 min as needed for opioid overdose symptoms, alternating nostrils.  INSULIN ASPART (NOVOLOG SC) by SubCUTAneous route Before breakfast, lunch, and dinner.  = 4 units  121-160 = 6 units  161-200 = 10 units  201-250 = 12 units  251-300 = 16 units  301-400 = 20 units  401-500 = 30 units  >501 = 40 units  Indications: sliding scale     No current facility-administered medications for this visit. Health Maintenance   Topic Date Due    Pneumococcal 0-64 years (1 of 3 - PCV13) 07/24/1971    DTaP/Tdap/Td series (1 - Tdap) 07/24/1986    Shingrix Vaccine Age 50> (1 of 2) 07/24/2015    FOBT Q 1 YEAR AGE 50-75  07/24/2015    HEMOGLOBIN A1C Q6M  04/02/2019    EYE EXAM RETINAL OR DILATED  06/12/2019 (Originally 7/24/1975)    Influenza Age 9 to Adult  08/01/2019    MICROALBUMIN Q1  08/22/2019    LIPID PANEL Q1  08/22/2019    FOOT EXAM Q1  10/02/2019    Prostate-Specific Antigen  01/09/2020    Hepatitis C Screening  Completed       There is no immunization history on file for this patient. Allergies and Medications: Reviewed and updated in EMR.      Patient Active Problem List   Diagnosis Code    Hypertrophy of salivary gland K11.1    Neuralgia, neuritis, and radiculitis, unspecified [id number]    Encounter for long-term (current) use of high-risk medication Z79.899    Chronic pain syndrome G89.4    Chronic pancreatitis ([facility] [address] K86.1    Essential hypertension I10    Nephrotic syndrome N04.9    Paroxysmal SVT (supraventricular tachycardia) (HCC) I47.1    Pancreatic insufficiency K86.89    HLD (hyperlipidemia) E78.5    GERD (gastroesophageal reflux disease) K21.9    Fatty liver K76.0    Type 1 diabetes mellitus with diabetic neuropathy (HCC) E10.40    Chronic abdominal pain R10.9, G89.29    Anxiety F41.9    Health care maintenance Z00.00    Hypermagnesemia E83.41    Elevated CK R74.8    Low calcium levels E83.51    Type 2 diabetes with nephropathy (HCC) E11.21    Severe obesity (HCC) E66.01    Plantar fasciitis, bilateral M72.2       Family History, Social History, Past Medical History, Surgical History: Reviewed and updated in EMR as appropriate. OBJECTIVE:   Visit Vitals  /65   Pulse 73   Temp 99.7 °F (37.6 °C) (Oral)   Resp 18   Ht 5' 9\" (1.753 m)   Wt 264 lb (119.7 kg)   SpO2 97%   BMI 38.99 kg/m²        BP Readings from Last 3 Encounters:   06/11/19 136/65   05/22/19 151/83   05/13/19 153/80     Wt Readings from Last 3 Encounters:   06/11/19 264 lb (119.7 kg)   05/22/19 259 lb (117.5 kg)   05/13/19 271 lb 6.4 oz (123.1 kg)       General: Pleasant in no acute distress  HEENT: Head is atraumatic normo-cephalic. CVS: Heart regular, rate, and rhythm. Audible S1 and S2. No murmurs, rubs or gallops. PULM: Lungs clear to auscultation bilaterally. No wheezes, rales or rhonchi. GI: Positive bowel sounds, soft, nondistended, non-tender. EXT: 2+ dorsalis pedis pulses bilaterally. 1+ pitting edema bilaterally  Neuro: Alert and oriented x3. Gait wnl  MSE: mood euthymic, affect congruent and reactive. No SI/HI. Nursing Notes Reviewed.     LABS/RADIOLOGICAL TESTS:    5/22/2019  8:09 PM - Luis Miguel, Lab In [facility]   Component Value Flag Ref Range Units Status   Sodium 136   136 - 145 mmol/L Final   Potassium 5.6  High   3.5 - 5.5 mmol/L Final   Chloride 97  Low   100 - 108 mmol/L Final   CO2 31   21 - 32 mmol/L Final   Anion gap 8   3.0 - 18 mmol/L Final   Glucose 125  High   74 - 99 mg/dL Final   BUN 31  High   7.0 - 18 MG/DL Final   Creatinine 1.80  High   0.6 - 1.3 MG/DL Final   BUN/Creatinine ratio 17   12 - 20   Final   GFR est AA 48  Low   >60 ml/min/1.73m2 Final   GFR est non-AA 40  Low   >60 ml/min/1.73m2 Final   Comment:   Calcium 8.7   8.5 - 10.1 MG/DL Final   Phosphorus 4.2   2.5 - 4.9 MG/DL Final   Albumin 3.2  Low   3.4 - 5.0 g/dL Final         All lab results and radiological studies were reviewed and discussed with the patient. ASSESSMENT/PLAN:      ICD-10-CM ICD-9-CM    1. [facility] (acute kidney injury) [facility] N17.9 584.9 RENAL FUNCTION PANEL  Continue current dose of lasix   2. Essential hypertension I10 401.9 Continue current mgmt   3. Nephrotic syndrome N04.9 581.9 F/u with nephrology  Continue current mgmt       Requested Prescriptions      No prescriptions requested or ordered in this encounter     Patient verbalized understanding and agreement with the plan. Patient was given an after-visit summary. Follow-up and Dispositions    · Return in about 2 weeks (around 6/24/2019), or if symptoms worsen or fail to improve, for Niurka, establish care with new provider. or sooner if worsening symptoms. More than 50% of this 25 min visit was spent counseling the patient face to face about etiology and treatment of health conditions outlined in assessment and plan. Miryam Forte M.D.   [address], [address] [place] [address] [phone]  [place] [phone]  [phone]

## 2019-06-12 LAB
ALBUMIN SERPL-MCNC: 3.2 G/DL (ref 3.5–5.5)
BUN SERPL-MCNC: 24 MG/DL (ref 6–24)
BUN/CREAT SERPL: 14 (ref 9–20)
CALCIUM SERPL-MCNC: 7.9 MG/DL (ref 8.7–10.2)
CHLORIDE SERPL-SCNC: 102 MMOL/L (ref 96–106)
CO2 SERPL-SCNC: 21 MMOL/L (ref 20–29)
CREAT SERPL-MCNC: 1.66 MG/DL (ref 0.76–1.27)
GLUCOSE SERPL-MCNC: 268 MG/DL (ref 65–99)
PHOSPHATE SERPL-MCNC: 3.9 MG/DL (ref 2.5–4.5)
POTASSIUM SERPL-SCNC: 5.2 MMOL/L (ref 3.5–5.2)
SODIUM SERPL-SCNC: 139 MMOL/L (ref 134–144)

## 2019-06-24 ENCOUNTER — OFFICE VISIT (OUTPATIENT)
Dept: FAMILY MEDICINE CLINIC | Age: 54
End: 2019-06-24

## 2019-06-24 VITALS
SYSTOLIC BLOOD PRESSURE: 178 MMHG | HEIGHT: 69 IN | BODY MASS INDEX: 39.4 KG/M2 | DIASTOLIC BLOOD PRESSURE: 94 MMHG | RESPIRATION RATE: 18 BRPM | OXYGEN SATURATION: 95 % | HEART RATE: 80 BPM | TEMPERATURE: 98.8 F | WEIGHT: 266 LBS

## 2019-06-24 DIAGNOSIS — N17.9 ACUTE KIDNEY INJURY (HCC): ICD-10-CM

## 2019-06-24 DIAGNOSIS — Z12.5 PROSTATE CANCER SCREENING: ICD-10-CM

## 2019-06-24 DIAGNOSIS — R60.0 LOWER EXTREMITY EDEMA: Primary | ICD-10-CM

## 2019-06-24 NOTE — PROGRESS NOTES
Chief Complaint   Patient presents with   [place] Establish Care     1. Have you been to the ER, urgent care clinic since your last visit? Hospitalized since your last visit? No    2. Have you seen or consulted any other health care providers outside of the [address] [place] [address] since your last visit? Include any pap smears or colon screening.  No

## 2019-06-24 NOTE — LETTER
NOTIFICATION RETURN TO WORK / SCHOOL 
 
6/24/2019 2:45 PM 
 
Mr. Estefani Love [place] [phone] [facility] [phone] To Whom It May Concern: 
 
Estefani oLve is currently under the care of [address] [place] [address]. He will return to work/school on: 06/25/2019 If there are questions or concerns please have the patient contact our office.  
 
 
 
Sincerely, 
 
 
Daxa Valladares PA-C

## 2019-06-25 LAB
ALBUMIN SERPL-MCNC: 3.5 G/DL (ref 3.5–5.5)
BUN SERPL-MCNC: 23 MG/DL (ref 6–24)
BUN/CREAT SERPL: 13 (ref 9–20)
CALCIUM SERPL-MCNC: 8.6 MG/DL (ref 8.7–10.2)
CHLORIDE SERPL-SCNC: 101 MMOL/L (ref 96–106)
CO2 SERPL-SCNC: 28 MMOL/L (ref 20–29)
CREAT SERPL-MCNC: 1.77 MG/DL (ref 0.76–1.27)
GLUCOSE SERPL-MCNC: 133 MG/DL (ref 65–99)
NT-PROBNP SERPL-MCNC: 487 PG/ML (ref 0–121)
PHOSPHATE SERPL-MCNC: 3.8 MG/DL (ref 2.5–4.5)
POTASSIUM SERPL-SCNC: 5.5 MMOL/L (ref 3.5–5.2)
PSA SERPL-MCNC: 0.3 NG/ML (ref 0–4)
SODIUM SERPL-SCNC: 139 MMOL/L (ref 134–144)

## 2019-06-26 ENCOUNTER — TELEPHONE (OUTPATIENT)
Dept: FAMILY MEDICINE CLINIC | Age: 54
End: 2019-06-26

## 2019-06-26 NOTE — TELEPHONE ENCOUNTER
Pt requesting to speak with Tj Fregoso regarding suggestion made at appt. States can call back at any time. States not urgent.

## 2019-06-28 NOTE — TELEPHONE ENCOUNTER
Called patient back and LVM to call back to discuss concerns. Please also tell patient to send me a [employer] message if I am not in the office and I can respond that way. Diane Chong PA-C  [facility]  [phone]  [address]

## 2019-07-10 ENCOUNTER — TELEPHONE (OUTPATIENT)
Dept: FAMILY MEDICINE CLINIC | Age: 54
End: 2019-07-10

## 2019-07-10 NOTE — TELEPHONE ENCOUNTER
Please have patient make sooner apt to review labs. Diane Chong PA-C  [facility]  [phone]  [address]

## 2019-07-15 NOTE — PROGRESS NOTES
ESTABLISH CARE VISIT    SUBJECTIVE:     Chief Complaint   Patient presents with    Establish Care    Swelling     swelling in hands, legs, face        HPI: 48 y.o.  male  has a past medical history of Adverse effect of anesthesia, Anxiety (2016), Chronic abdominal pain, Chronic pancreatitis ([facility] [address] Diabetes type 2, controlled ([facility] [address] (2005), Fatty liver, GERD (gastroesophageal reflux disease), HLD (hyperlipidemia), HTN (hypertension) (08/22/2018), Morbid obesity ([facility] [address] Nephrotic syndrome (08/21/2018), Pancreatic insufficiency, and Paroxysmal SVT (supraventricular tachycardia) ([facility] [address] (1998). is here for the above chief complaint(s). Patient here today to establish care. Hypertriglyceridemia   Currently taking      fenofibrate nanocrystallized (TRICOR) 48 mg tablet (Taking) Take 2 Tabs by mouth daily for 180 days. Chronic Pancreatitis  Patient has gastro doctor, needs to schedule follow-up. Patient had partial pancreatectomy. Is followed by endocrine for DMII. On high dose insulin, but managing well. Chronic Pain  Patient has chronic pain in abdomen from pancreatitis. He is a [employer]  and believes many of his chronic ailments are from the [employer]. Nephrotic syndrome: continues to see nephrologist. Not adhering to fuid restriction, working on salt restriction. Has increased lasix to 40 mg once a day for 2 weeks with minimal Improvement in leg swelling but some improvement. Patient states that he has not scheduled an apt with nephrology. Hypertension/PSVT  Patient is currently taking   Key CAD CHF Meds             lisinopril (PRINIVIL, ZESTRIL) 40 mg tablet (Taking) TAKE 1 TABLET DAILY    furosemide (LASIX) 40 mg tablet (Taking) TAKE 1 TABLET DAILY, MAY  TAKE AN EXTRA DOSE AT  1:00PM IF YOU HAVE  INCREASED SWELLING    fenofibrate nanocrystallized (TRICOR) 48 mg tablet (Taking) Take 2 Tabs by mouth daily for 180 days.     metoprolol succinate (TOPROL-XL) 25 mg XL tablet (Taking) Take 1 Tab by mouth daily for 180 days. . BP today is   BP Readings from Last 1 Encounters:   06/24/19 (!) 178/94     Patient has been taking medications as prescribed. Patient is not checking BP at home. Patient does not follow low salt diet. Patient is not currently exercising. Patient denies chest pain, shortness of breath, palpitations, lower extremity edema, dizziness/lightheadedness or syncopal episodes. Patient does state that he had a high salt day yesterday. He also endorses bilateral lower extremity edema. Health Maintenance:    Eye -  no complaints, last eye exam: needs to update, referral was made at last visit. Oral Health -  no complaints, last exam:   Hearing -  no complaints. U/A -  no UTI sxs. Testicular Exam - does self-exams, declines exam today. Prostate - no prostate cancer in the family  Colonoscopy - no colon cancer in the family      Review of Systems   Constitutional: Negative for chills, fever, malaise/fatigue and weight loss. Eyes: Negative for blurred vision, double vision and pain. Respiratory: Negative for cough, sputum production, shortness of breath and wheezing. Cardiovascular: Negative for chest pain, palpitations, orthopnea, claudication and leg swelling. Gastrointestinal: Positive for abdominal pain (chronic pancreatitis). Negative for constipation, diarrhea, nausea and vomiting. Genitourinary: Negative for dysuria, frequency and urgency. Neurological: Negative for dizziness, tingling and headaches. @[facility]@  Current Outpatient Medications   Medication Sig    lipase/protease/amylase (ZENPEP PO) Take  by mouth.  gabapentin (NEURONTIN) 300 mg capsule Take 1 Cap by mouth three (3) times daily.  BASAGLAR KWIKPEN U-100 INSULIN 100 unit/mL (3 mL) inpn 75 Units by SubCUTAneous route nightly.     lisinopril (PRINIVIL, ZESTRIL) 40 mg tablet TAKE 1 TABLET DAILY    potassium chloride (K-DUR, KLOR-CON) 20 mEq tablet TAKE 1 TABLET DAILY, TAKE  AN EXTRA DOSE IF YOU TAKE  THE EXTRA DOSE OF  FUROSEMIDE    escitalopram oxalate (LEXAPRO) 10 mg tablet TAKE 1 TABLET DAILY    furosemide (LASIX) 40 mg tablet TAKE 1 TABLET DAILY, MAY  TAKE AN EXTRA DOSE AT  1:00PM IF YOU HAVE  INCREASED SWELLING (Patient taking differently: TAKE 1.5 TABLET DAILY, MAY  TAKE AN EXTRA DOSE AT  1:00PM IF YOU HAVE  INCREASED SWELLING)    fenofibrate nanocrystallized (TRICOR) 48 mg tablet Take 2 Tabs by mouth daily for 180 days.  metoprolol succinate (TOPROL-XL) 25 mg XL tablet Take 1 Tab by mouth daily for 180 days.  OTHER Kratom    INSULIN ASPART (NOVOLOG SC) by SubCUTAneous route Before breakfast, lunch, and dinner.  = 4 units  121-160 = 6 units  161-200 = 10 units  201-250 = 12 units  251-300 = 16 units  301-400 = 20 units  401-500 = 30 units  >501 = 40 units  Indications: sliding scale    Insulin Syringe-Needle U-100 (ADVOCATE SYRINGES) 0.3 mL 31 gauge x 5/16\" syrg For insulin dosing TID    Insulin Syringe-Needle U-100 (ADVOCATE SYRINGES) 1 mL 31 gauge x 5/16 syrg Using for insulin dosing once a day    Blood-Glucose Meter monitoring kit by Does Not Apply route. One touch ultra mini    Insulin Needles, Disposable, 31 gauge x 5/16\" ndle QID insulin dosing SC    naloxone (NARCAN) 4 mg/actuation nasal spray Use 1 spray intranasally, then discard. Repeat with new spray every 2 min as needed for opioid overdose symptoms, alternating nostrils. No current facility-administered medications for this visit.       Health Maintenance   Topic Date Due    Pneumococcal 0-64 years (1 of 3 - PCV13) 07/24/1971    EYE EXAM RETINAL OR DILATED  07/24/1975    DTaP/Tdap/Td series (1 - Tdap) 07/24/1986    Shingrix Vaccine Age 50> (1 of 2) 07/24/2015    FOBT Q 1 YEAR AGE 50-75  07/24/2015    HEMOGLOBIN A1C Q6M  04/02/2019    Influenza Age 9 to Adult  08/01/2019    MICROALBUMIN Q1  08/22/2019    LIPID PANEL Q1  08/22/2019    FOOT EXAM Q1  10/02/2019    Prostate-Specific Antigen 01/09/2020    Hepatitis C Screening  Completed       Medications and Allergies: Reviewed and confirmed in the chart    Past Medical Hx: Reviewed and confirmed in the chart  Past Medical History:   Diagnosis Date    Adverse effect of anesthesia     if wakes too fast he wakes up combative    Anxiety 2016    lexapro 10 mg daily    Chronic abdominal pain     Secondary to pancreatitis    Chronic pancreatitis ([facility] [address]     Diabetes type 2, controlled ([facility] [address] 2005    s/p partial pancreactomy 2005    Fatty liver     GERD (gastroesophageal reflux disease)     HLD (hyperlipidemia)     HTN (hypertension) 08/22/2018    Morbid obesity ([facility] [address]     Nephrotic syndrome 08/21/2018    Pancreatic insufficiency     Paroxysmal SVT (supraventricular tachycardia) ([facility] [address] 1998    intermittently occurs, last occured a couple weeks ago       Patient Active Problem List   Diagnosis Code    Hypertrophy of salivary gland K11.1    Neuralgia, neuritis, and radiculitis, unspecified [id number]    Encounter for long-term (current) use of high-risk medication Z79.899    Chronic pain syndrome G89.4    Chronic pancreatitis ([facility] [address] K86.1    Essential hypertension I10    Nephrotic syndrome N04.9    Paroxysmal SVT (supraventricular tachycardia) (HCC) I47.1    Pancreatic insufficiency K86.89    HLD (hyperlipidemia) E78.5    GERD (gastroesophageal reflux disease) K21.9    Fatty liver K76.0    Type 1 diabetes mellitus with diabetic neuropathy (HCC) E10.40    Chronic abdominal pain R10.9, G89.29    Anxiety F41.9    Health care maintenance Z00.00    Hypermagnesemia E83.41    Elevated CK R74.8    Low calcium levels E83.51    Type 2 diabetes with nephropathy (HCC) E11.21    Severe obesity (HCC) E66.01    Plantar fasciitis, bilateral M72.2       Family Hx, Surgical Hx, Social Hx: Reviewed and updated in EMR    OBJECTIVE:  Vitals:    06/24/19 1354 06/24/19 1402   BP: 185/89 (!) 178/94   Pulse: 80    Resp: 18    Temp: 98.8 °F (37.1 °C) TempSrc: Oral    SpO2: 95%    Weight: 266 lb (120.7 kg)    Height: 5' 9\" (1.753 m)        BP Readings from Last 3 Encounters:   06/24/19 (!) 178/94   06/11/19 136/65   05/22/19 151/83     Wt Readings from Last 3 Encounters:   06/24/19 266 lb (120.7 kg)   06/11/19 264 lb (119.7 kg)   05/22/19 259 lb (117.5 kg)     Physical Exam   Constitutional: He is oriented to person, place, and time and well-developed, well-nourished, and in no distress. No distress. Neck: Normal range of motion. Neck supple. No thyromegaly present. Cardiovascular: Normal rate, regular rhythm, normal heart sounds and intact distal pulses. Exam reveals no gallop and no friction rub. No murmur heard. Pulses:       Carotid pulses are 2+ on the right side, and 2+ on the left side. 2+ pitting edema     Pulmonary/Chest: Effort normal and breath sounds normal. No respiratory distress. He has no wheezes. He has no rales. He exhibits no tenderness. Lymphadenopathy:     He has no cervical adenopathy. Neurological: He is alert and oriented to person, place, and time. Skin: Skin is warm and dry. He is not diaphoretic. Psychiatric: Mood, memory, affect and judgment normal.   Vitals reviewed.       Lab Results   Component Value Date/Time    WBC 8.1 08/23/2018 03:51 AM    HGB 12.6 08/23/2018 03:51 AM    HCT 39.3 08/23/2018 03:51 AM    PLATELET 492 21/64/4600 03:51 AM     Lab Results   Component Value Date/Time    Sodium 139 06/11/2019 05:02 PM    Potassium 5.2 06/11/2019 05:02 PM    Chloride 102 06/11/2019 05:02 PM    CO2 21 06/11/2019 05:02 PM    Glucose 268 (H) 06/11/2019 05:02 PM    BUN 24 06/11/2019 05:02 PM    Creatinine 1.66 (H) 06/11/2019 05:02 PM     Lab Results   Component Value Date/Time    Cholesterol, total 224 (H) 08/22/2018 08:02 AM    HDL Cholesterol 27 (L) 08/22/2018 08:02 AM    LDL, calculated 126 08/22/2018 08:02 AM    Triglyceride 356 (H) 08/22/2018 08:02 AM     No results found for: GPT    Nursing Notes Reviewed    ASSESSMENT AND PLAN  Diagnoses and all orders for this visit:    1. Lower extremity edema  -     NT-PRO BNP; Future    2. Acute kidney injury ([facility] [address]  -     RENAL FUNCTION PANEL; Future  Follow-up with nephrology as scheduled. 3. Prostate cancer screening  -     PSA SCREENING (SCREENING); Future        I have discussed the diagnosis with the patient and the intended plan as seen in the above orders. The patient has received an after-visit summary and questions were answered concerning future plans. I have discussed medication side effects and warnings with the patient as well. I have reviewed the plan of care with the patient, accepted their input and they are in agreement with the treatment goals. More than 50% of this 40 min visit was spent counseling the patient face to face about etiology and treatment of health conditions outlined in assessment and plan        Diane Mccarthy [address]   [facility] -   [phone]

## 2019-09-15 DIAGNOSIS — I10 ESSENTIAL HYPERTENSION: ICD-10-CM

## 2019-09-15 DIAGNOSIS — N18.9 CHRONIC KIDNEY DISEASE, UNSPECIFIED CKD STAGE: ICD-10-CM

## 2019-09-15 RX ORDER — LISINOPRIL 40 MG/1
TABLET ORAL
Qty: 90 TAB | Refills: 1 | OUTPATIENT
Start: 2019-09-15

## 2019-09-15 RX ORDER — PANCRELIPASE LIPASE, PANCRELIPASE PROTEASE, PANCRELIPASE AMYLASE 20000; 63000; 84000 [USP'U]/1; [USP'U]/1; [USP'U]/1
CAPSULE, DELAYED RELEASE ORAL
Qty: 300 CAP | OUTPATIENT
Start: 2019-09-15

## 2019-09-18 DIAGNOSIS — G89.29 CHRONIC ABDOMINAL PAIN: ICD-10-CM

## 2019-09-18 DIAGNOSIS — R10.9 CHRONIC ABDOMINAL PAIN: ICD-10-CM

## 2019-09-18 DIAGNOSIS — K86.1 IDIOPATHIC CHRONIC PANCREATITIS (HCC): ICD-10-CM

## 2019-09-19 DIAGNOSIS — K21.9 GASTROESOPHAGEAL REFLUX DISEASE, ESOPHAGITIS PRESENCE NOT SPECIFIED: ICD-10-CM

## 2019-09-19 RX ORDER — OMEPRAZOLE 20 MG/1
20 CAPSULE, DELAYED RELEASE ORAL DAILY
Qty: 90 CAP | Refills: 1 | Status: CANCELLED | OUTPATIENT
Start: 2019-09-19 | End: 2020-03-17

## 2019-09-19 RX ORDER — GABAPENTIN 300 MG/1
300 CAPSULE ORAL 3 TIMES DAILY
Qty: 90 CAP | Refills: 1 | OUTPATIENT
Start: 2019-09-19

## 2019-09-19 NOTE — TELEPHONE ENCOUNTER
Will discuss refill at his atp on 9/23/2019 for gabapentin. Diane Chong PA-C  [facility]  [phone]  [address]

## 2019-09-19 NOTE — TELEPHONE ENCOUNTER
Per fax,    Requested Prescriptions     Pending Prescriptions Disp Refills    omeprazole (PRILOSEC) 20 mg capsule 90 Cap 1     Sig: Take 1 Cap by mouth daily for 180 days.

## 2019-09-19 NOTE — TELEPHONE ENCOUNTER
Please call and find out if this patient has followed up with his nephrologist. I don't see any notes on whether or not he should continue this medication. If he has seen his nephrologist, we need the notes please. Thank you. Diane Chong PA-C  [facility]  [phone]  [address]

## 2019-09-20 RX ORDER — LISINOPRIL 40 MG/1
TABLET ORAL
Qty: 30 TAB | Refills: 0 | Status: SHIPPED | OUTPATIENT
Start: 2019-09-20 | End: 2019-10-03

## 2019-09-20 NOTE — TELEPHONE ENCOUNTER
Will fill for 30 days until writer can get more information from nephrologist.     Radha Green.  CHE Chong  [facility]  [phone]  [address]

## 2019-09-20 NOTE — TELEPHONE ENCOUNTER
I called patient, Shelly Harrison is full unable to leave a message.  Patient has an appt on Monday and can be addressed then

## 2019-09-23 ENCOUNTER — OFFICE VISIT (OUTPATIENT)
Dept: FAMILY MEDICINE CLINIC | Age: 54
End: 2019-09-23

## 2019-09-23 VITALS
RESPIRATION RATE: 18 BRPM | TEMPERATURE: 98.8 F | WEIGHT: 276 LBS | OXYGEN SATURATION: 96 % | BODY MASS INDEX: 40.76 KG/M2

## 2019-09-23 DIAGNOSIS — E11.21 TYPE 2 DIABETES WITH NEPHROPATHY (HCC): ICD-10-CM

## 2019-09-23 DIAGNOSIS — K86.1 IDIOPATHIC CHRONIC PANCREATITIS (HCC): ICD-10-CM

## 2019-09-23 DIAGNOSIS — F41.9 ANXIETY: Primary | ICD-10-CM

## 2019-09-23 DIAGNOSIS — E87.5 HYPERKALEMIA: ICD-10-CM

## 2019-09-23 DIAGNOSIS — R79.89 ELEVATED BRAIN NATRIURETIC PEPTIDE (BNP) LEVEL: ICD-10-CM

## 2019-09-23 DIAGNOSIS — E10.40 TYPE 1 DIABETES MELLITUS WITH DIABETIC NEUROPATHY (HCC): ICD-10-CM

## 2019-09-23 DIAGNOSIS — N04.9 NEPHROTIC SYNDROME: ICD-10-CM

## 2019-09-23 RX ORDER — ESCITALOPRAM OXALATE 20 MG/1
20 TABLET ORAL DAILY
Qty: 90 TAB | Refills: 0 | Status: SHIPPED | OUTPATIENT
Start: 2019-09-23 | End: 2019-10-24 | Stop reason: SDUPTHER

## 2019-09-23 RX ORDER — GABAPENTIN 600 MG/1
600 TABLET ORAL 3 TIMES DAILY
Qty: 270 TAB | Refills: 0 | Status: SHIPPED | OUTPATIENT
Start: 2019-09-23 | End: 2019-12-22

## 2019-09-23 RX ORDER — GABAPENTIN 300 MG/1
300 CAPSULE ORAL 3 TIMES DAILY
Qty: 90 CAP | Refills: 0 | Status: SHIPPED | OUTPATIENT
Start: 2019-09-23 | End: 2019-09-23 | Stop reason: ALTCHOICE

## 2019-09-23 NOTE — PROGRESS NOTES
Chief Complaint   Patient presents with    Hypertension    Swelling     1. Have you been to the ER, urgent care clinic since your last visit? Hospitalized since your last visit? No    2. Have you seen or consulted any other health care providers outside of the [address] [place] [address] since your last visit? Include any pap smears or colon screening.  No

## 2019-09-24 ENCOUNTER — DOCUMENTATION ONLY (OUTPATIENT)
Dept: FAMILY MEDICINE CLINIC | Age: 54
End: 2019-09-24

## 2019-09-24 NOTE — PROGRESS NOTES
Prescription for Gabapentin faxed to [facility] per providers request.  Fax confirmation shows it was received.

## 2019-09-26 ENCOUNTER — TELEPHONE (OUTPATIENT)
Dept: FAMILY MEDICINE CLINIC | Age: 54
End: 2019-09-26

## 2019-09-26 NOTE — TELEPHONE ENCOUNTER
Spoke to patient, states that he is full of fluid and having SOB. States that he is going to [facility]. Routed to provider as Denice Solis

## 2019-09-27 ENCOUNTER — TELEPHONE (OUTPATIENT)
Dept: FAMILY MEDICINE CLINIC | Age: 54
End: 2019-09-27

## 2019-09-27 PROBLEM — R06.00 DYSPNEA: Status: ACTIVE | Noted: 2019-09-27

## 2019-09-27 PROBLEM — J81.1 PULMONARY EDEMA: Status: ACTIVE | Noted: 2019-09-27

## 2019-09-27 PROBLEM — R60.1 ANASARCA: Status: ACTIVE | Noted: 2019-09-27

## 2019-09-27 NOTE — TELEPHONE ENCOUNTER
Spoke with patient. He is admitted to [facility] for fluid overload. He is being diuresed with IV lasix. Told patient to make a follow-up apt as soon as he is discharged. Patient verbalized understanding. Diane Chong PA-C  [facility]  [phone]  [facility], [address] [place] [address]

## 2019-09-29 NOTE — PROGRESS NOTES
Follow Up Visit Note    Chief Complaint   Patient presents with    Hypertension    Swelling    Depression       HPI:  Carmita Johnson is a 47 y.o.  male  has a past medical history of Adverse effect of anesthesia, Anxiety (2016), Chronic abdominal pain, Chronic pancreatitis ([address] CKD (chronic kidney disease) stage 3, GFR 30-59 ml/min ([address] Fatty liver, GERD (gastroesophageal reflux disease), HLD (hyperlipidemia), HTN (hypertension) (08/22/2018), Morbid obesity ([address] Nephrotic syndrome (08/21/2018), Pancreatic insufficiency, Paroxysmal SVT (supraventricular tachycardia) ([address] (1998), and Type 1 DM with CKD stage 3 and hypertension ([address] (2005). is here for the above complaint(s). Nephrotic Syndrome  Patient sees Dr. Liam Scott for [address], nephrotic syndrome. Follow-up apt not schedule, needs to make apt. Patient has not seen him in 6 months. Depression follow-up  The patient presents complaining of an ongoing history of feeling depressed. The patient reports they are currently under a great deal of stress. We reviewed the patients current life stressors. These recent events include his girlfriend of 3 years is breaking up with him. .  The patient does difficulty concentrating and emotional labiality. The patient reports their sleep is terrible. The patients appetite is not great. The patient has difficulty concentrating at work and completing required tasks. The patient endorses anhedonia. The patient reports they have had other times in their life when they have felt depressed. The patient denies any suicidal or homicidal ideation. Review of Systems   Constitutional: Negative for chills, fever, malaise/fatigue and weight loss. Eyes: Negative for blurred vision, double vision and pain. Respiratory: Negative for cough, sputum production, shortness of breath and wheezing. Cardiovascular: Positive for leg swelling.  Negative for chest pain, palpitations, orthopnea and claudication. Gastrointestinal: Negative for abdominal pain, constipation, diarrhea, nausea and vomiting. Genitourinary: Negative for dysuria, frequency and urgency. Neurological: Negative for dizziness, tingling and headaches. Psychiatric/Behavioral: Positive for depression. Negative for hallucinations, memory loss, substance abuse and suicidal ideas. The patient is not nervous/anxious and does not have insomnia. No current facility-administered medications for this visit. No current outpatient medications on file.      Facility-Administered Medications Ordered in Other Visits   Medication Dose Route Frequency    buPROPion SR (WELLBUTRIN SR) tablet 150 mg  150 mg Oral BID    oxyCODONE-acetaminophen (PERCOCET) 5-325 mg per tablet 1 Tab  1 Tab Oral Q4H PRN    dextrose (D50) infusion 5-25 g  10-50 mL IntraVENous PRN    glucagon (GLUCAGEN) injection 1 mg  1 mg IntraMUSCular PRN    insulin glargine (LANTUS) injection 1-100 Units  1-100 Units SubCUTAneous QHS    insulin lispro (HUMALOG) injection 1-45 Units  1-45 Units SubCUTAneous AC&HS    insulin lispro (HUMALOG) injection 1-45 Units  1-45 Units SubCUTAneous PRN    heparin (porcine) injection 5,000 Units  5,000 Units SubCUTAneous Q8H    ondansetron (ZOFRAN) injection 4 mg  4 mg IntraVENous Q6H PRN    promethazine (PHENERGAN) tablet 25 mg  25 mg Oral Q6H PRN    acetaminophen (TYLENOL) tablet 650 mg  650 mg Oral Q6H PRN    melatonin tablet 3 mg  3 mg Oral QHS PRN    docusate sodium (COLACE) capsule 100 mg  100 mg Oral BID PRN    polyethylene glycol (MIRALAX) packet 17 g  17 g Oral DAILY PRN    atorvastatin (LIPITOR) tablet 40 mg  40 mg Oral QHS    escitalopram oxalate (LEXAPRO) tablet 10 mg  10 mg Oral BID    gabapentin (NEURONTIN) capsule 600 mg  600 mg Oral TID    metoprolol succinate (TOPROL-XL) XL tablet 50 mg  50 mg Oral QHS    omeprazole (PRILOSEC) capsule 20 mg  20 mg Oral QHS    potassium chloride (K-DUR, KLOR-CON) SR tablet 20 mEq  20 mEq Oral DAILY    furosemide (LASIX) injection 80 mg  80 mg IntraVENous Q8H    nicotine (NICODERM CQ) 21 mg/24 hr patch 1 Patch  1 Patch TransDERmal DAILY    lipase-protease-amylase (ZENPEP 20,000) capsule 60,000 Units  (Patient Supplied)  60,000 Units Oral TID     Health Maintenance   Topic Date Due    Pneumococcal 0-64 years (1 of 3 - PCV13) 07/24/1971    EYE EXAM RETINAL OR DILATED  07/24/1975    DTaP/Tdap/Td series (1 - Tdap) 07/24/1986    Shingrix Vaccine Age 50> (1 of 2) 07/24/2015    FOBT Q 1 YEAR AGE 50-75  07/24/2015    HEMOGLOBIN A1C Q6M  04/02/2019    Influenza Age 9 to Adult  08/01/2019    MICROALBUMIN Q1  08/22/2019    LIPID PANEL Q1  08/22/2019    FOOT EXAM Q1  10/02/2019    Prostate-Specific Antigen  06/24/2021    Hepatitis C Screening  Completed       There is no immunization history on file for this patient. Allergies and Medications: Reviewed and updated in EMR. Past Medical History:   Diagnosis Date    Adverse effect of anesthesia     if wakes too fast he wakes up combative    Anxiety 2016    lexapro 10 mg daily    Chronic abdominal pain     Secondary to pancreatitis    Chronic pancreatitis ([address]     CKD (chronic kidney disease) stage 3, GFR 30-59 ml/min (HCC)     due to diabetic nephrosclerosis    Fatty liver     GERD (gastroesophageal reflux disease)     HLD (hyperlipidemia)     HTN (hypertension) 08/22/2018    Morbid obesity ([address]     Nephrotic syndrome 08/21/2018    due to Diabetic nephropathy, s/p renal biopsy (9/12/2018)    Pancreatic insufficiency     Paroxysmal SVT (supraventricular tachycardia) ([address] 1998    s/p ablation of accessory bypass tract    Type 1 DM with CKD stage 3 and hypertension ([address] 2005    s/p partial pancreactomy 2005       Surgical History: Reviewed and updated in EMR as appropriate. Social History: Reviewed and updated in EMR as appropriate. Family History: Reviewed and updated in EMR as appropriate.     OBJECTIVE: Visit Vitals  Temp 98.8 °F (37.1 °C) (Oral)   Resp 18   Ht (P) 5' 9\" (1.753 m)   Wt 276 lb (125.2 kg)   SpO2 96%   BMI (P) 40.76 kg/m²        Physical Exam   Constitutional: He is oriented to person, place, and time and well-developed, well-nourished, and in no distress. No distress. Neck: Normal range of motion. Neck supple. No thyromegaly present. Cardiovascular: Normal rate, regular rhythm, normal heart sounds and intact distal pulses. Exam reveals no gallop and no friction rub. No murmur heard. Bilateral lower extremity edema - 2+   Pulmonary/Chest: Effort normal and breath sounds normal. No respiratory distress. He has no wheezes. He has no rales. He exhibits no tenderness. Lymphadenopathy:     He has no cervical adenopathy. Neurological: He is alert and oriented to person, place, and time. Skin: Skin is warm and dry. He is not diaphoretic. Psychiatric: Mood, memory, affect and judgment normal.   Nursing note and vitals reviewed. LABS/RADIOLOGICAL TESTS:  Lab Results   Component Value Date/Time    WBC 9.4 09/26/2019 10:25 PM    HGB 13.2 09/26/2019 10:25 PM    HCT 38.5 09/26/2019 10:25 PM    PLATELET 914 10/75/0931 10:25 PM     Lab Results   Component Value Date/Time    Sodium 132 (L) 09/28/2019 02:23 AM    Potassium 4.0 09/28/2019 02:23 AM    Chloride 94 (L) 09/28/2019 02:23 AM    CO2 33 (H) 09/28/2019 02:23 AM    Glucose 150 (H) 09/28/2019 02:23 AM    BUN 19 09/28/2019 02:23 AM    Creatinine 1.7 (H) 09/28/2019 02:23 AM     Lab Results   Component Value Date/Time    Cholesterol, total 224 (H) 08/22/2018 08:02 AM    HDL Cholesterol 27 (L) 08/22/2018 08:02 AM    LDL, calculated 126 08/22/2018 08:02 AM    Triglyceride 356 (H) 08/22/2018 08:02 AM     No results found for: GPT  Lab Results   Component Value Date/Time    Hemoglobin A1c 7.6 (H) 08/22/2018 08:02 AM         All lab results and radiological studies were reviewed and discussed with the patient.     ASSESSMENT/PLAN:    Diagnoses and all orders for this visit:    1. Anxiety  -     escitalopram oxalate (LEXAPRO) 20 mg tablet; Take 1 Tab by mouth daily. Referral given for therapy services       Patient Education:  Reviewed need for daily exercise. Need for sun exposure at least 20mintues daily. Reviewed need for good social support, surrounding self with helpful and uplifting family and friends. Recommended daily meditation or reflection through prayer, prayer, [place] chi. Informed to call this clinic if he has any sort of suicidal or homicidal ideation. Reviewed suicide hotline. Follow up here or with PCP earlier if dramatic worsening symptoms in the next 2 weeks. Make a follow up appt with PCP in 1 month. Patient voices understanding to the plan of care above. 2. Idiopathic chronic pancreatitis (HCC)  Follow-up with GI.     3. Elevated brain natriuretic peptide (BNP) level  -     ECHO ADULT COMPLETE; Future    4. Nephrotic syndrome  Urged patient to schedule follow-up with nephrology. Patient verbalizes understanding. 5. Hyperkalemia  -     METABOLIC PANEL, COMPREHENSIVE; Future  -     MAGNESIUM; Future    6. Type 2 diabetes with nephropathy (HCC)  -     HEMOGLOBIN A1C WITH EAG; Future  -     MICROALBUMIN, UR, RAND W/ MICROALB/CREAT RATIO; Future  -     gabapentin (NEURONTIN) 600 mg tablet; Take 1 Tab by mouth three (3) times daily for 90 days. Max Daily Amount: 1,800 mg.  1)   Goal HBA1C < 7.  2)   Post prandial blood sugar less than or equal to 180.  3)   Exercise 30 min 3-5 times a week for goal BMI of 25.  4)   Please monitor blood sugars as directed and keep a log to bring in with you at each visit. 5)   Diabetic diet discussed and patient instructions to be handed out. 6)   Goal LDL< 100  7)   Goal BP< 120/80 mmhg. 8)   Annual eye exam and foot exam.  9)   Please examine you feet daily for any skin breakages or ulcers. 10) Referral made to see nutritionist for better dietary guidance.   11) Continue current medications as prescribed. 12) Explained the side effects of medication and patient verbalized understanding. 13) Please avoid smoking , alcohol and illicit drug use if applicable to you.  14) Please have blood work ordered today completed. 15) Please make sure you schedule your routine medical exam every 1-2 years. 7. Type 1 diabetes mellitus with diabetic neuropathy (HCC)  -     HEMOGLOBIN A1C WITH EAG; Future  -     MICROALBUMIN, UR, RAND W/ MICROALB/CREAT RATIO; Future  -     gabapentin (NEURONTIN) 600 mg tablet; Take 1 Tab by mouth three (3) times daily for 90 days. Max Daily Amount: 1,800 mg. ICD-10-CM ICD-9-CM    1. Elevated brain natriuretic peptide (BNP) level R79.89 790.99 ECHO ADULT COMPLETE   2. Idiopathic chronic pancreatitis (HCC) K86.1 577.1 DISCONTINUED: gabapentin (NEURONTIN) 300 mg capsule   3. Chronic abdominal pain R10.9 789.00 DISCONTINUED: gabapentin (NEURONTIN) 300 mg capsule    G89.29 338.29    4. Anxiety F41.9 300.00 escitalopram oxalate (LEXAPRO) 20 mg tablet   5. Nephrotic syndrome N04.9 581.9    6. Hyperkalemia W72.9 918.8 METABOLIC PANEL, COMPREHENSIVE      MAGNESIUM   7. Type 2 diabetes with nephropathy (HCC) E11.21 250.40 HEMOGLOBIN A1C WITH EAG     583.81 MICROALBUMIN, UR, RAND W/ MICROALB/CREAT RATIO      gabapentin (NEURONTIN) 600 mg tablet   8. Type 1 diabetes mellitus with diabetic neuropathy (HCC) E10.40 250.61 HEMOGLOBIN A1C WITH EAG     357.2 MICROALBUMIN, UR, RAND W/ MICROALB/CREAT RATIO      gabapentin (NEURONTIN) 600 mg tablet       Requested Prescriptions     Signed Prescriptions Disp Refills    escitalopram oxalate (LEXAPRO) 20 mg tablet 90 Tab 0     Sig: Take 1 Tab by mouth daily.  gabapentin (NEURONTIN) 600 mg tablet 270 Tab 0     Sig: Take 1 Tab by mouth three (3) times daily for 90 days. Max Daily Amount: 1,800 mg. Patient verbalized understanding and agreement with the plan. Patient was given an after-visit summary.     Follow-up in 1 month or sooner if worsening symptoms. More than 50% of this 40 min visit was spent counseling the patient face to face about etiology and treatment of health conditions outlined in assessment and plan      Diane Chong PA-C  [address], [address] [place] [address], [address] [place] [address] [phone]

## 2019-10-24 ENCOUNTER — OFFICE VISIT (OUTPATIENT)
Dept: FAMILY MEDICINE CLINIC | Age: 54
End: 2019-10-24

## 2019-10-24 VITALS
TEMPERATURE: 98.2 F | SYSTOLIC BLOOD PRESSURE: 160 MMHG | BODY MASS INDEX: 36.43 KG/M2 | WEIGHT: 246 LBS | DIASTOLIC BLOOD PRESSURE: 92 MMHG | RESPIRATION RATE: 18 BRPM | OXYGEN SATURATION: 98 % | HEART RATE: 76 BPM | HEIGHT: 69 IN

## 2019-10-24 DIAGNOSIS — N18.30 TYPE 1 DM WITH CKD STAGE 3 AND HYPERTENSION (HCC): ICD-10-CM

## 2019-10-24 DIAGNOSIS — F41.9 ANXIETY: ICD-10-CM

## 2019-10-24 DIAGNOSIS — E10.22 TYPE 1 DM WITH CKD STAGE 3 AND HYPERTENSION (HCC): ICD-10-CM

## 2019-10-24 DIAGNOSIS — I12.9 TYPE 1 DM WITH CKD STAGE 3 AND HYPERTENSION (HCC): ICD-10-CM

## 2019-10-24 DIAGNOSIS — I10 ESSENTIAL HYPERTENSION: Primary | ICD-10-CM

## 2019-10-24 RX ORDER — AMLODIPINE BESYLATE 5 MG/1
5 TABLET ORAL DAILY
Qty: 30 TAB | Refills: 1 | Status: SHIPPED | OUTPATIENT
Start: 2019-10-24 | End: 2021-04-24 | Stop reason: DRUGHIGH

## 2019-10-24 RX ORDER — ESCITALOPRAM OXALATE 20 MG/1
20 TABLET ORAL DAILY
Qty: 90 TAB | Refills: 0 | Status: SHIPPED | OUTPATIENT
Start: 2019-10-24 | End: 2021-04-24 | Stop reason: DRUGHIGH

## 2019-10-24 NOTE — PROGRESS NOTES
Internal Medicine  Transition of Care Note/Hospital or Rehab Follow up  [facility]  [address], [place], [facility], [address]  Phone [phone]; Fax [phone]    Date of Service:  10/24/2019  Patient's Name and : Alanis Kaur 1965   Assessment/Plan:       Alanis Kaur is a 47 y.o. male seen today for follow up after hospitalization for :      Discharge Diagnosis:   1. Nephrotic syndrome with anasarca  2. Pulmonary edema due to nephrotic syndrome  3. GURPREET on CKD 3 (POA)  4. Hyperkalemia  5. Hyponatremia  6. 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 Obesity, Body mass index is 40.64 kg/m².   8. HTN    Hospital Course:    Admit to the hospital with GURPREET on CKD. Noted to have worsening anasarca. Seen by nephrology. Started on IV diuresis, and Bumex continuous drip. Diuresed well and put out good urine. Transitioned over stay to Lasix 80 mg twice daily, in addition to metolazone 5 mg daily as guided by nephrology. Overall feeling better and stable for discharge with outpatient follow-up.     Current Discharge Medication List             START taking these medications     Details   metOLazone (ZAROXOLYN) 5 mg tablet Take 1 Tab by mouth daily for 30 days. Qty: 30 Tab, Refills: 0                 CONTINUE these medications which have CHANGED     Details   furosemide (LASIX) 80 mg tablet Take 1 Tab by mouth two (2) times a day for 30 days.   Qty: 60 Tab, Refills: 0     Associated Diagnoses: Nephrotic syndrome; Chronic kidney disease, unspecified CKD stage           CONTINUE these medications which have NOT CHANGED     Details   atorvastatin (LIPITOR) 40 mg tablet Take  by mouth daily.       omeprazole (PRILOSEC) 20 mg capsule Take 20 mg by mouth daily.       metoprolol succinate (TOPROL-XL) 50 mg XL tablet Take  by mouth daily.       escitalopram oxalate (LEXAPRO) 20 mg tablet Take 1 Tab by mouth daily.  Qty: 90 Tab, Refills: 0     Associated Diagnoses: Anxiety       gabapentin (NEURONTIN) 600 mg tablet Take 1 Tab by mouth three (3) times daily for 90 days. Max Daily Amount: 1,800 mg. Qty: 270 Tab, Refills: 0     Associated Diagnoses: Type 2 diabetes with nephropathy ([address] Type 1 diabetes mellitus with diabetic neuropathy (HCC)       lipase/protease/amylase (ZENPEP PO) Take 60,000 Units by mouth three (3) times daily.       BASAGLAR KWIKPEN U-100 INSULIN 100 unit/mL (3 mL) inpn 75 Units by SubCUTAneous route nightly.       potassium chloride (K-DUR, KLOR-CON) 20 mEq tablet TAKE 1 TABLET DAILY, TAKE  AN EXTRA DOSE IF YOU TAKE  THE EXTRA DOSE OF  FUROSEMIDE  Qty: 180 Tab, Refills: 1     Associated Diagnoses: Nephrotic syndrome; Chronic kidney disease, unspecified CKD stage       Blood-Glucose Meter monitoring kit by Does Not Apply route. One touch ultra mini       INSULIN ASPART (NOVOLOG SC) by SubCUTAneous route Before breakfast, lunch, and dinner.  = 4 units  121-160 = 6 units  161-200 = 10 units  201-250 = 12 units  251-300 = 16 units  301-400 = 20 units  401-500 = 30 units  >501 = 40 units  Indications: sliding scale       Insulin Syringe-Needle U-100 (ADVOCATE SYRINGES) 0.3 mL 31 gauge x 5/16\" syrg For insulin dosing TID  Qty: 100 Syringe, Refills: 11     Associated Diagnoses: Type 1 diabetes mellitus with diabetic neuropathy (HCC)       Insulin Syringe-Needle U-100 (ADVOCATE SYRINGES) 1 mL 31 gauge x 5/16 syrg Using for insulin dosing once a day  Qty: 90 Syringe, Refills: 11     Associated Diagnoses: Type 1 diabetes mellitus with diabetic neuropathy (HCC)       Insulin Needles, Disposable, 31 gauge x 5/16\" ndle QID insulin dosing SC  Qty: 1 Package, Refills: 11     Associated Diagnoses: Type 1 diabetes mellitus with diabetic neuropathy (HCC)       OTHER Kratom       naloxone (NARCAN) 4 mg/actuation nasal spray Use 1 spray intranasally, then discard.  Repeat with new spray every 2 min as needed for opioid overdose symptoms, alternating nostrils. Qty: 1 Each, Refills: 1                STOP taking these medications         lisinopril (PRINIVIL, ZESTRIL) 40 mg tablet Comments:   Reason for Stopping:                  The patient states understanding of recommended treatment plan. Diane Chong PA-C    History:         Ada January was hospitalized at [facility] from 09/26/2019-10/3/2019. Patient WAS NOT contacted by office to arrange appointment. Hospital notes, testing results and discharge summary reviewed and briefly summarized below. Hospital course AS ABOVE    Patient discharged to home withOUT home health services. Patient complains of ONGOING DEPRESSION, otherwise the remainder of the review of systems is negative. Patient Active Problem List    Diagnosis    Anasarca    Pulmonary edema    Dyspnea    CKD (chronic kidney disease) stage 3, GFR 30-59 ml/min (HCC)     due to diabetic nephrosclerosis      Plantar fasciitis, bilateral    Type 2 diabetes with nephropathy (HCC)    Severe obesity (HCC)    Hypermagnesemia    Elevated CK    Low calcium levels    Health care maintenance     Flu shot: has obtained in past.   Tdap: 4 years ago. Pneumovax 23/13: Due  Zoster Vaccine:  Due    Colon Cancer Screening: Completed a few years ago  CT scan Lung Cancer Screen: Due at age 54  Hepatitis C: tested through [employer], negative. Glaucoma: 2 years ago completed   Prostate Screen: Due now          Pancreatic insufficiency    HLD (hyperlipidemia)    GERD (gastroesophageal reflux disease)    Fatty liver    Chronic abdominal pain     Secondary to pancreatitis      Essential hypertension    Nephrotic syndrome     9/24/2018 Dr. Phil Monroe: CKD 3, monitor. Nephrotic syndrome s/p renal biopsy 9/2018, urine protien/cr 6.89 BP control, DM control, continue acei. , increased atorvastatin.  Return in3 months      Anxiety     lexapro 10 mg daily      Chronic pain syndrome    Chronic pancreatitis [facility])     Per patient 2/2 hypertriglyceride      Hypertrophy of salivary gland    Neuralgia, neuritis, and radiculitis, unspecified    Encounter for long-term (current) use of high-risk medication    Type 1 diabetes mellitus with diabetic neuropathy ([facility]     s/p partial pancreactomy 2005  Followed by Dr. Georgianna Severin, last seen 10/2/2018, found to have foot ulcer base of left 2nd toe referred to podiatry  humalog 35 units TIDAC, lantus 70 daily      Type 1 DM with CKD stage 3 and hypertension ([facility]     s/p partial pancreactomy 2005      Paroxysmal SVT (supraventricular tachycardia) (HCC)     intermittently occurs, last occured a couple weeks ago             Objective:     BP (!) 160/92   Pulse 76   Temp 98.2 °F (36.8 °C) (Oral)   Resp 18   Ht 5' 9\" (1.753 m)   Wt 246 lb (111.6 kg)   SpO2 98%   BMI 36.33 kg/m²     Physical Exam   Constitutional: He is oriented to person, place, and time and well-developed, well-nourished, and in no distress. No distress. Neck: Normal range of motion. Neck supple. No thyromegaly present. Cardiovascular: Normal rate, regular rhythm, normal heart sounds and intact distal pulses. Exam reveals no gallop and no friction rub. No murmur heard. 1+ bilateral LE edema   Pulmonary/Chest: Effort normal and breath sounds normal. No respiratory distress. He has no wheezes. He has no rales. He exhibits no tenderness. Lymphadenopathy:     He has no cervical adenopathy. Neurological: He is alert and oriented to person, place, and time. Skin: Skin is warm and dry. He is not diaphoretic. Psychiatric: Mood, memory, affect and judgment normal.   Vitals reviewed. Medications:     Current Outpatient Medications   Medication Sig    furosemide (LASIX) 80 mg tablet Take 1 Tab by mouth two (2) times a day for 30 days.  metOLazone (ZAROXOLYN) 5 mg tablet Take 1 Tab by mouth daily for 30 days.  atorvastatin (LIPITOR) 40 mg tablet Take  by mouth daily.     omeprazole (PRILOSEC) 20 mg capsule Take 20 mg by mouth daily.  metoprolol succinate (TOPROL-XL) 50 mg XL tablet Take  by mouth daily.  escitalopram oxalate (LEXAPRO) 20 mg tablet Take 1 Tab by mouth daily.  gabapentin (NEURONTIN) 600 mg tablet Take 1 Tab by mouth three (3) times daily for 90 days. Max Daily Amount: 1,800 mg.    lipase/protease/amylase (ZENPEP PO) Take 60,000 Units by mouth three (3) times daily.  BASAGLAR KWIKPEN U-100 INSULIN 100 unit/mL (3 mL) inpn 75 Units by SubCUTAneous route nightly.  potassium chloride (K-DUR, KLOR-CON) 20 mEq tablet TAKE 1 TABLET DAILY, TAKE  AN EXTRA DOSE IF YOU TAKE  THE EXTRA DOSE OF  FUROSEMIDE    Insulin Syringe-Needle U-100 (ADVOCATE SYRINGES) 0.3 mL 31 gauge x 5/16\" syrg For insulin dosing TID    Blood-Glucose Meter monitoring kit by Does Not Apply route. One touch ultra mini    Insulin Needles, Disposable, 31 gauge x 5/16\" ndle QID insulin dosing SC    OTHER Kratom    INSULIN ASPART (NOVOLOG SC) by SubCUTAneous route Before breakfast, lunch, and dinner.  = 4 units  121-160 = 6 units  161-200 = 10 units  201-250 = 12 units  251-300 = 16 units  301-400 = 20 units  401-500 = 30 units  >501 = 40 units  Indications: sliding scale    Insulin Syringe-Needle U-100 (ADVOCATE SYRINGES) 1 mL 31 gauge x 5/16 syrg Using for insulin dosing once a day    naloxone (NARCAN) 4 mg/actuation nasal spray Use 1 spray intranasally, then discard. Repeat with new spray every 2 min as needed for opioid overdose symptoms, alternating nostrils. No current facility-administered medications for this visit. Additional History     Past Surgical History:   Procedure Laterality Date    ABLATION OF SVT  1998    partially successful    HX CHOLECYSTECTOMY      HX ORTHOPAEDIC      HX OTHER SURGICAL  2005    Pancreatectomy     Social History     Socioeconomic History    Marital status:      Spouse name:      Number of children: 1    Years of education: 15    Highest education level: Not on file   Tobacco Use    Smoking status: Current Every Day Smoker     Packs/day: 1.00     Types: Cigarettes    Smokeless tobacco: Never Used    Tobacco comment: pt switched to vapor, onset at 21years old   Substance and Sexual Activity    Alcohol use: No     Alcohol/week: 0.0 standard drinks    Drug use: No    Sexual activity: Yes     Partners: Female   Other Topics Concern     Service Yes     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[place] [employer]    Blood Transfusions Yes   Social History Narrative    Tobacco use: Smokes 1 pack per day since age [facility]    Alcohol use: None    Illicit drug use: None        Currently lives in [place] with his dog    The patient is a , his wife  in  from congestive heart failure    He has one son age 32        He works as a  for [employer] to high school in [place], he is a [employer] fan        His father is currently on peritoneal dialysis for ESRD     Family History   Problem Relation Age of Onset    Cancer Father         Kidney cancer    Breast Cancer Sister     Diabetes Maternal Grandmother     Ovarian Cancer Maternal Grandmother     Prostate Cancer Neg Hx     Colon Cancer Neg Hx      Allergies   Allergen Reactions    Adhesive Contact Dermatitis     Severe blistering from steri strips following surgery (no systemic reaction or breathing issues)    Lipitor [Atorvastatin] Myalgia       Encounter Diagnoses:   Diagnoses and all orders for this visit:    1. Essential hypertension  Key CAD CHF Meds             amLODIPine (NORVASC) 5 mg tablet (Taking) Take 1 Tab by mouth daily. furosemide (LASIX) 80 mg tablet  Take 1 Tab by mouth two (2) times a day for 30 days. metOLazone (ZAROXOLYN) 5 mg tablet Take 1 Tab by mouth daily for 30 days. atorvastatin (LIPITOR) 40 mg tablet (Taking) Take  by mouth daily. metoprolol succinate (TOPROL-XL) 50 mg XL tablet (Taking) Take  by mouth daily. -     amLODIPine (NORVASC) 5 mg tablet; Take 1 Tab by mouth daily.  -     METABOLIC PANEL, COMPREHENSIVE; Future  -     MICROALBUMIN, UR, RAND W/ MICROALB/CREAT RATIO; Future  -     HEMOGLOBIN A1C WITH EAG; Future  -     CBC WITH AUTOMATED DIFF; Future  1) Goal blood pressure less than equal to 140/90 mmHg, goal BP can vary depending on risk factors as discussed. 2) Lifestyle modifications discussed with patient, low sodium <2 gm, low salt , DASH diet  3) Exercise for at least 30 min 3-5 times a week for goal BMI of less than or equal  To 25.  4) Continue current medications as prescribed. 5) Please begin medication as discussed for better blood pressure control, explained side effects and patient verbalized understanding. 6) Goal LDL<100.  7) Please monitor your blood pressure and keep a log to bring in with you at each visit. 8) Discussed risk factors with patient such as CAD, FAST of stroke symptoms, pt verbalizes understanding. 9) Please avoid smoking , alcohol and any illicit drug use if applicable to you. 10) Discussed lifestyle modifications ,dietary control and BP monitoring at homE  . Follow-up with nephrology as scheduled. 2. Anxiety  -  Increase escitalopram oxalate (LEXAPRO) 20 mg tablet; Take 1 Tab by mouth daily. 3. Type 1 DM with CKD stage 3 and hypertension (HCC)  -     MICROALBUMIN, UR, RAND W/ MICROALB/CREAT RATIO; Future  -     HEMOGLOBIN A1C WITH EAG; Future  Follow-up with endocrine as scheduled. Other orders  -     CBC WITH AUTOMATED DIFF  -     METABOLIC PANEL, COMPREHENSIVE  -     MICROALBUMIN, UR, RAND  -     HEMOGLOBIN A1C WITH EAG    Discussed with patient that he needs to follow-up with specialists as scheduled. I have also recommended that he transfer his primary care to an internal medicine physician as he has exceeded the level of care that I am able to provide for him.  Patient verbalizes understanding that failure to follow instructions could result in serious consequences to his health including the possibility of death. This document may have been created with the aid of dictation software. Text may contain errors, particularly phonetic errors.

## 2019-10-25 ENCOUNTER — TELEPHONE (OUTPATIENT)
Dept: FAMILY MEDICINE CLINIC | Age: 54
End: 2019-10-25

## 2019-10-25 LAB
ALBUMIN SERPL-MCNC: 3.5 G/DL (ref 3.5–5.5)
ALBUMIN/CREAT UR: 4840.5 MG/G CREAT (ref 0–30)
ALBUMIN/GLOB SERPL: 1.2 {RATIO} (ref 1.2–2.2)
ALP SERPL-CCNC: 104 IU/L (ref 39–117)
ALT SERPL-CCNC: 15 IU/L (ref 0–44)
AST SERPL-CCNC: 20 IU/L (ref 0–40)
BASOPHILS # BLD AUTO: 0.1 X10E3/UL (ref 0–0.2)
BASOPHILS NFR BLD AUTO: 1 %
BILIRUB SERPL-MCNC: 0.2 MG/DL (ref 0–1.2)
BUN SERPL-MCNC: 33 MG/DL (ref 6–24)
BUN/CREAT SERPL: 16 (ref 9–20)
CALCIUM SERPL-MCNC: 8.6 MG/DL (ref 8.7–10.2)
CHLORIDE SERPL-SCNC: 85 MMOL/L (ref 96–106)
CO2 SERPL-SCNC: 28 MMOL/L (ref 20–29)
CREAT SERPL-MCNC: 2.05 MG/DL (ref 0.76–1.27)
CREAT UR-MCNC: 70.1 MG/DL
EOSINOPHIL # BLD AUTO: 0.4 X10E3/UL (ref 0–0.4)
EOSINOPHIL NFR BLD AUTO: 3 %
ERYTHROCYTE [DISTWIDTH] IN BLOOD BY AUTOMATED COUNT: 13 % (ref 12.3–15.4)
EST. AVERAGE GLUCOSE BLD GHB EST-MCNC: 235 MG/DL
GLOBULIN SER CALC-MCNC: 3 G/DL (ref 1.5–4.5)
GLUCOSE SERPL-MCNC: 310 MG/DL (ref 65–99)
HBA1C MFR BLD: 9.8 % (ref 4.8–5.6)
HCT VFR BLD AUTO: 44.8 % (ref 37.5–51)
HGB BLD-MCNC: 14.6 G/DL (ref 13–17.7)
IMM GRANULOCYTES # BLD AUTO: 0 X10E3/UL (ref 0–0.1)
IMM GRANULOCYTES NFR BLD AUTO: 0 %
LYMPHOCYTES # BLD AUTO: 4.3 X10E3/UL (ref 0.7–3.1)
LYMPHOCYTES NFR BLD AUTO: 30 %
MCH RBC QN AUTO: 27.5 PG (ref 26.6–33)
MCHC RBC AUTO-ENTMCNC: 32.6 G/DL (ref 31.5–35.7)
MCV RBC AUTO: 85 FL (ref 79–97)
MICROALBUMIN UR-MCNC: 3393.2 UG/ML
MONOCYTES # BLD AUTO: 1.4 X10E3/UL (ref 0.1–0.9)
MONOCYTES NFR BLD AUTO: 10 %
NEUTROPHILS # BLD AUTO: 8 X10E3/UL (ref 1.4–7)
NEUTROPHILS NFR BLD AUTO: 56 %
PLATELET # BLD AUTO: 284 X10E3/UL (ref 150–450)
POTASSIUM SERPL-SCNC: 4.6 MMOL/L (ref 3.5–5.2)
PROT SERPL-MCNC: 6.5 G/DL (ref 6–8.5)
RBC # BLD AUTO: 5.3 X10E6/UL (ref 4.14–5.8)
SODIUM SERPL-SCNC: 128 MMOL/L (ref 134–144)
WBC # BLD AUTO: 14.3 X10E3/UL (ref 3.4–10.8)

## 2019-10-25 NOTE — TELEPHONE ENCOUNTER
[place] paperwork filled out and ready to . Diane Chong PA-C  [facility]  [address]  [address]

## 2019-10-28 NOTE — TELEPHONE ENCOUNTER
Tried to contact patient to inform him that his [facility] paperwork is ready to be picked up but his voicemail box is full.

## 2019-11-13 ENCOUNTER — TELEPHONE (OUTPATIENT)
Dept: FAMILY MEDICINE CLINIC | Age: 54
End: 2019-11-13

## 2019-11-13 NOTE — TELEPHONE ENCOUNTER
Pt requesting to speak w/ Liset Bragg. He is scheduled for an appoint ment on 11/19/19 however, he states he currently has no motivation & would like to discuss a possible leave of absence from work.

## 2019-11-14 NOTE — TELEPHONE ENCOUNTER
Called patient and spoke to him at length about his chronic issues. He has an apt with new PCP next week, Dr. Howie Morales. He has not seen the nephrologist as advise. He states that he has an apt with new nephrologist next month. I explained to him that he needed to be seen urgently by his previous nephrologist if he is unable to get in sooner. He verbalized understanding and also stated that he may need a note for work this week. Due to his ongoing, serious health concerns I will most certainly write him a note to allow him to be out of work to take to take care of his health. He will be by to pick it up. Diane Chong PA-C  [facility]  [address]  [place], [address] [place] [address]

## 2019-11-15 RX ORDER — PANCRELIPASE LIPASE, PANCRELIPASE PROTEASE, PANCRELIPASE AMYLASE 20000; 63000; 84000 [USP'U]/1; [USP'U]/1; [USP'U]/1
CAPSULE, DELAYED RELEASE ORAL
Qty: 90 CAP | Refills: 0 | Status: SHIPPED | OUTPATIENT
Start: 2019-11-15 | End: 2021-04-24 | Stop reason: DRUGHIGH

## 2019-11-15 RX ORDER — OMEPRAZOLE 20 MG/1
CAPSULE, DELAYED RELEASE ORAL
Qty: 90 CAP | Refills: 0 | Status: SHIPPED | OUTPATIENT
Start: 2019-11-15

## 2019-11-15 RX ORDER — FUROSEMIDE 80 MG/1
80 TABLET ORAL 2 TIMES DAILY
Qty: 60 TAB | Refills: 0 | Status: ON HOLD | OUTPATIENT
Start: 2019-11-15 | End: 2020-09-20 | Stop reason: SDUPTHER

## 2019-11-15 RX ORDER — FUROSEMIDE 40 MG/1
TABLET ORAL
Qty: 135 TAB | OUTPATIENT
Start: 2019-11-15

## 2019-11-19 ENCOUNTER — OFFICE VISIT (OUTPATIENT)
Dept: FAMILY MEDICINE CLINIC | Age: 54
End: 2019-11-19

## 2019-11-19 VITALS
TEMPERATURE: 97.4 F | BODY MASS INDEX: 36.88 KG/M2 | WEIGHT: 249 LBS | SYSTOLIC BLOOD PRESSURE: 127 MMHG | HEIGHT: 69 IN | OXYGEN SATURATION: 96 % | DIASTOLIC BLOOD PRESSURE: 74 MMHG | HEART RATE: 77 BPM | RESPIRATION RATE: 18 BRPM

## 2019-11-19 DIAGNOSIS — Z20.2 STD EXPOSURE: Primary | ICD-10-CM

## 2019-11-19 DIAGNOSIS — N18.30 CKD (CHRONIC KIDNEY DISEASE) STAGE 3, GFR 30-59 ML/MIN (HCC): ICD-10-CM

## 2019-11-19 NOTE — PROGRESS NOTES
Stevan Saavedra presents today for   Chief Complaint   Patient presents with    Follow-up       Is someone accompanying this pt? no    Is the patient using any DME equipment during OV? no    Depression Screening:  3 most recent PHQ Screens 5/22/2019   Little interest or pleasure in doing things Not at all   Feeling down, depressed, irritable, or hopeless Not at all   Total Score PHQ 2 0       Learning Assessment:  No flowsheet data found. Abuse Screening:  Abuse Screening Questionnaire 5/22/2019   Do you ever feel afraid of your partner? N   Are you in a relationship with someone who physically or mentally threatens you? N   Is it safe for you to go home? Y       Fall Risk  Fall Risk Assessment, last 12 mths 5/22/2019   Able to walk? Yes   Fall in past 12 months? No       Health Maintenance reviewed and discussed and ordered per Provider. Health Maintenance Due   Topic Date Due    Pneumococcal 0-64 years (1 of 3 - PCV13) 07/24/1971    EYE EXAM RETINAL OR DILATED  07/24/1975    DTaP/Tdap/Td series (1 - Tdap) 07/24/1986    Shingrix Vaccine Age 50> (1 of 2) 07/24/2015    FOBT Q 1 YEAR AGE 50-75  07/24/2015    Influenza Age 9 to Adult  08/01/2019    LIPID PANEL Q1  08/22/2019    FOOT EXAM Q1  10/02/2019   . Coordination of Care:  1. Have you been to the ER, urgent care clinic since your last visit? Hospitalized since your last visit? no    2. Have you seen or consulted any other health care providers outside of the [address] [place] [address] since your last visit? Include any pap smears or colon screening.  Endocrinology, Nephrology      Last  Checked na  Last UDS Checked na  Last Pain contract signed: na

## 2019-11-19 NOTE — PROGRESS NOTES
Follow Up Visit Note    Chief Complaint   Patient presents with    Follow-up       HPI:  Peyton Leung is a 47 y.o.  male  has a past medical history of Adverse effect of anesthesia, Anxiety (2016), Chronic abdominal pain, Chronic pancreatitis ([facility] CKD (chronic kidney disease) stage 3, GFR 30-59 ml/min ([address] Fatty liver, GERD (gastroesophageal reflux disease), HLD (hyperlipidemia), HTN (hypertension) (08/22/2018), Morbid obesity ([address] Nephrotic syndrome (08/21/2018), Pancreatic insufficiency, Paroxysmal SVT (supraventricular tachycardia) ([address] (1998), and Type 1 DM with CKD stage 3 and hypertension ([facility] (2005). is here for the above complaint(s). CKD  Patient saw nephrologist and he is preparing him to start dialysis. Patient to follow-up in 1 week with nephrology. STD exposure  Patient reports history of STD exposure and would like testing for this today with routine blood work. Patient denies any symptoms of STD at this times. ROS      Current Outpatient Medications   Medication Sig    omeprazole (PRILOSEC) 20 mg capsule TAKE 1 CAPSULE BY MOUTH  DAILY    ZENPEP 20,000-63,000- 84,000 unit capsule TAKE 1 CAPSULE THREE TIMES  A DAY WITH MEALS    furosemide (LASIX) 80 mg tablet Take 1 Tab by mouth two (2) times a day.  escitalopram oxalate (LEXAPRO) 20 mg tablet Take 1 Tab by mouth daily.  amLODIPine (NORVASC) 5 mg tablet Take 1 Tab by mouth daily.  atorvastatin (LIPITOR) 40 mg tablet Take  by mouth daily.  metoprolol succinate (TOPROL-XL) 50 mg XL tablet Take  by mouth daily.  gabapentin (NEURONTIN) 600 mg tablet Take 1 Tab by mouth three (3) times daily for 90 days. Max Daily Amount: 1,800 mg.    BASAGLAR KWIKPEN U-100 INSULIN 100 unit/mL (3 mL) inpn 75 Units by SubCUTAneous route nightly.     potassium chloride (K-DUR, KLOR-CON) 20 mEq tablet TAKE 1 TABLET DAILY, TAKE  AN EXTRA DOSE IF YOU TAKE  THE EXTRA DOSE OF  FUROSEMIDE    Blood-Glucose Meter monitoring kit by Does Not Apply route. One touch ultra mini    OTHER Kratom    INSULIN ASPART (NOVOLOG SC) by SubCUTAneous route Before breakfast, lunch, and dinner.  = 4 units  121-160 = 6 units  161-200 = 10 units  201-250 = 12 units  251-300 = 16 units  301-400 = 20 units  401-500 = 30 units  >501 = 40 units  Indications: sliding scale    omeprazole (PRILOSEC) 20 mg capsule Take 20 mg by mouth daily.  lipase/protease/amylase (ZENPEP PO) Take 60,000 Units by mouth three (3) times daily.  Insulin Syringe-Needle U-100 (ADVOCATE SYRINGES) 0.3 mL 31 gauge x 5/16\" syrg For insulin dosing TID    Insulin Syringe-Needle U-100 (ADVOCATE SYRINGES) 1 mL 31 gauge x 5/16 syrg Using for insulin dosing once a day    Insulin Needles, Disposable, 31 gauge x 5/16\" ndle QID insulin dosing SC    naloxone (NARCAN) 4 mg/actuation nasal spray Use 1 spray intranasally, then discard. Repeat with new spray every 2 min as needed for opioid overdose symptoms, alternating nostrils. No current facility-administered medications for this visit. Health Maintenance   Topic Date Due    Pneumococcal 0-64 years (1 of 3 - PCV13) 07/24/1971    EYE EXAM RETINAL OR DILATED  07/24/1975    DTaP/Tdap/Td series (1 - Tdap) 07/24/1986    Shingrix Vaccine Age 50> (1 of 2) 07/24/2015    FOBT Q 1 YEAR AGE 50-75  07/24/2015    Influenza Age 9 to Adult  08/01/2019    LIPID PANEL Q1  08/22/2019    FOOT EXAM Q1  10/02/2019    HEMOGLOBIN A1C Q6M  04/24/2020    MICROALBUMIN Q1  10/24/2020    Prostate-Specific Antigen  06/24/2021    Hepatitis C Screening  Completed       There is no immunization history on file for this patient. Allergies and Medications: Reviewed and updated in EMR.      Past Medical History:   Diagnosis Date    Adverse effect of anesthesia     if wakes too fast he wakes up combative    Anxiety 2016    lexapro 10 mg daily    Chronic abdominal pain     Secondary to pancreatitis    Chronic [facility]itis ([facility]     CKD (chronic kidney disease) stage 3, GFR 30-59 ml/min (HCC)     due to diabetic nephrosclerosis    Fatty liver     GERD (gastroesophageal reflux disease)     HLD (hyperlipidemia)     HTN (hypertension) 08/22/2018    Morbid obesity ([facility]     Nephrotic syndrome 08/21/2018    due to Diabetic nephropathy, s/p renal biopsy (9/12/2018)    Pancreatic insufficiency     Paroxysmal SVT (supraventricular tachycardia) ([facility] 1998    s/p ablation of accessory bypass tract    Type 1 DM with CKD stage 3 and hypertension ([facility] 2005    s/p partial pancreactomy 2005       Surgical History: Reviewed and updated in EMR as appropriate. Social History: Reviewed and updated in EMR as appropriate. Family History: Reviewed and updated in EMR as appropriate. OBJECTIVE:   Visit Vitals  /74   Pulse 77   Temp 97.4 °F (36.3 °C) (Oral)   Resp 18   Ht 5' 9\" (1.753 m)   Wt 249 lb (112.9 kg)   SpO2 96%   BMI 36.77 kg/m²        Physical Exam    LABS/RADIOLOGICAL TESTS:  Lab Results   Component Value Date/Time    WBC 14.3 (H) 10/24/2019 12:49 PM    HGB 14.6 10/24/2019 12:49 PM    HCT 44.8 10/24/2019 12:49 PM    PLATELET 552 85/06/7155 12:49 PM     Lab Results   Component Value Date/Time    Sodium 128 (L) 10/24/2019 12:49 PM    Potassium 4.6 10/24/2019 12:49 PM    Chloride 85 (L) 10/24/2019 12:49 PM    CO2 28 10/24/2019 12:49 PM    Glucose 310 (H) 10/24/2019 12:49 PM    BUN 33 (H) 10/24/2019 12:49 PM    Creatinine 2.05 (H) 10/24/2019 12:49 PM     Lab Results   Component Value Date/Time    Cholesterol, total 224 (H) 08/22/2018 08:02 AM    HDL Cholesterol 27 (L) 08/22/2018 08:02 AM    LDL, calculated 126 08/22/2018 08:02 AM    Triglyceride 356 (H) 08/22/2018 08:02 AM     No results found for: GPT  Lab Results   Component Value Date/Time    Hemoglobin A1c 9.8 (H) 10/24/2019 12:49 PM         All lab results and radiological studies were reviewed and discussed with the patient. ASSESSMENT/PLAN:    Diagnoses and all orders for this visit:    1.  STD exposure  -     CHLAMYDIA/GC PCR; Future  -     T PALLIDUM AB; Future  -     HEP B SURFACE AG; Future  -     HEPATITIS C AB; Future  -     HIV 1/2 AG/AB, 4TH GENERATION,W RFLX CONFIRM; Future    2. CKD  Patient to follow-up with nephrology as scheduled next week to begin dialysis. Patient verbalized understanding and agreement with the plan. Patient was given an after-visit summary. Follow-up in [address] [place] [address] PCP or sooner if worsening symptoms. More than 50% of this 15 min visit was spent counseling the patient face to face about etiology and treatment of health conditions outlined in assessment and plan        Diane Chong PA-C  [address], [address] [place] [address], [address] [phone]  [place] [phone]

## 2019-11-30 LAB
C TRACH RRNA SPEC QL NAA+PROBE: NEGATIVE
HBV SURFACE AG SERPL QL IA: NEGATIVE
HCV AB S/CO SERPL IA: <0.1 S/CO RATIO (ref 0–0.9)
HIV 1+2 AB+HIV1 P24 AG SERPL QL IA: NON REACTIVE
N GONORRHOEA RRNA SPEC QL NAA+PROBE: NEGATIVE
TREPONEMA PALLIDUM IGG+IGM AB [PRESENCE] IN SERUM OR PLASMA BY IMMUNOASSAY: NON REACTIVE

## 2019-12-02 ENCOUNTER — TELEPHONE (OUTPATIENT)
Dept: FAMILY MEDICINE CLINIC | Age: 54
End: 2019-12-02

## 2019-12-02 NOTE — TELEPHONE ENCOUNTER
Pt called for lab results, I explained that his provider was on PTO until Wednesday, 12/4. Please call pt back for lab results.

## 2019-12-03 NOTE — TELEPHONE ENCOUNTER
Please let patient know that his STD testing all came back negative. Diaen Chong PA-C  [facility]  [phone]  [address]

## 2020-02-19 ENCOUNTER — OFFICE VISIT (OUTPATIENT)
Dept: FAMILY MEDICINE CLINIC | Age: 55
End: 2020-02-19

## 2020-02-19 VITALS
HEART RATE: 65 BPM | SYSTOLIC BLOOD PRESSURE: 168 MMHG | WEIGHT: 243 LBS | TEMPERATURE: 97.2 F | DIASTOLIC BLOOD PRESSURE: 86 MMHG | OXYGEN SATURATION: 98 % | HEIGHT: 69 IN | BODY MASS INDEX: 35.99 KG/M2 | RESPIRATION RATE: 16 BRPM

## 2020-02-19 DIAGNOSIS — I10 ESSENTIAL HYPERTENSION: ICD-10-CM

## 2020-02-19 DIAGNOSIS — N18.9 CHRONIC KIDNEY DISEASE, UNSPECIFIED CKD STAGE: ICD-10-CM

## 2020-02-19 RX ORDER — GABAPENTIN 600 MG/1
600 TABLET ORAL 3 TIMES DAILY
Status: ON HOLD | COMMUNITY
End: 2021-01-02 | Stop reason: SDUPTHER

## 2020-02-19 RX ORDER — LISINOPRIL 40 MG/1
TABLET ORAL
Qty: 30 TAB | Refills: 0
Start: 2020-02-19 | End: 2020-09-20

## 2020-02-19 RX ORDER — PSEUDOEPHEDRINE HCL 30 MG
TABLET ORAL 4 TIMES DAILY
Qty: 1 TUBE | Refills: 1 | Status: SHIPPED | OUTPATIENT
Start: 2020-02-19 | End: 2020-09-15

## 2020-02-19 RX ORDER — DOXYCYCLINE 100 MG/1
100 CAPSULE ORAL 2 TIMES DAILY
Qty: 20 CAP | Refills: 0 | Status: SHIPPED | OUTPATIENT
Start: 2020-02-19 | End: 2020-02-29

## 2020-02-19 NOTE — PROGRESS NOTES
Maxwell Flores is a 47 y.o. male and presents with New Patient; Knee Pain (Both knees and muscle spasm.); and Cough (yellowish phlegm with chest congestion x2 weeks. )       Subjective:    Type 1 DM- uncontrolled. bs avg 140 pt reports. Left knee pain-for 3 days- no right knee pain- started hurting after coming off and on a machine- pain. Feels like it gives out. Not swollen or erythematous- worse with walking/using steps, better at rest. Pain is sharp, no radiation. No f/c. No direct injury. No meds tried. Cough for 2 weeks. With yellow phlegm. No other sx. No sob or cp.   htn - on lisinopril. No cp or sob.    stage 4 CKD- seeing nephro. Assessment/Plan:        Diagnoses and all orders for this visit:    1. Type 1 diabetes, uncontrolled, with renal manifestation ([facility] [address]- continue to follow with specialists- importance of control stressed to avoid   Comments:  sees endocrinology and renal.   -     lisinopril (PRINIVIL, ZEST  2. Essential hypertension-not well controlled-discussed with patient and this is followed by renal- improved on recheck but still elevated. RIL) 40 mg tablet; TAKE 1 TABLET DAILY    3. Chronic kidney disease, unspecified CKD stage-continue to follow with nephrology-     lisinopril (PRINIVIL, ZESTRIL) 40 mg tablet; TAKE 1 TABLET DAILY    Other orders  -     menthol 2 % topical gel; Apply  to affected area four (4) times daily. -     doxycycline (VIBRAMYCIN) 100 mg capsule; Take 1 Cap by mouth two (2) times a day for 10 days. ther orders  -     menthol 2 % topical gel; Apply  to affected area four (4) times daily. RTC in 3 months or prn. Orders Placed This Encounter    gabapentin (NEURONTIN) 600 mg tablet     Sig: Take  by mouth three (3) times daily.  lisinopril (PRINIVIL, ZESTRIL) 40 mg tablet     Sig: TAKE 1 TABLET DAILY     Dispense:  30 Tab     Refill:  0    menthol 2 % topical gel     Sig: Apply  to affected area four (4) times daily.      Dispense:  1 Tube Refill:  1    doxycycline (VIBRAMYCIN) 100 mg capsule     Sig: Take 1 Cap by mouth two (2) times a day for 10 days. Dispense:  20 Cap     Refill:  0               ROS:  Negative except as mentioned above  Cardiac- no chest pain or palpitations  Pulmonary- no sob or wheezes  GI- no n/v or diarrhea. SH:  Social History     Tobacco Use    Smoking status: Current Every Day Smoker     Packs/day: 1.00     Types: Cigarettes    Smokeless tobacco: Never Used    Tobacco comment: pt switched to vapor, onset at 21years old   Substance Use Topics    Alcohol use: No     Alcohol/week: 0.0 standard drinks    Drug use: No         Medications/Allergies:  Current Outpatient Medications on File Prior to Visit   Medication Sig Dispense Refill    gabapentin (NEURONTIN) 600 mg tablet Take  by mouth three (3) times daily.  omeprazole (PRILOSEC) 20 mg capsule TAKE 1 CAPSULE BY MOUTH  DAILY 90 Cap 0    ZENPEP 20,000-63,000- 84,000 unit capsule TAKE 1 CAPSULE THREE TIMES  A DAY WITH MEALS (Patient taking differently: 3 Caps three (3) times daily (with meals). ) 90 Cap 0    furosemide (LASIX) 80 mg tablet Take 1 Tab by mouth two (2) times a day. 60 Tab 0    escitalopram oxalate (LEXAPRO) 20 mg tablet Take 1 Tab by mouth daily. (Patient taking differently: Take 2,040 mg by mouth daily.) 90 Tab 0    amLODIPine (NORVASC) 5 mg tablet Take 1 Tab by mouth daily. 30 Tab 1    atorvastatin (LIPITOR) 40 mg tablet Take  by mouth daily.  omeprazole (PRILOSEC) 20 mg capsule Take 20 mg by mouth daily.  metoprolol succinate (TOPROL-XL) 50 mg XL tablet Take  by mouth daily.  lipase/protease/amylase (ZENPEP PO) Take 60,000 Units by mouth three (3) times daily.  BASAGLAR KWIKPEN U-100 INSULIN 100 unit/mL (3 mL) inpn 80 Units by SubCUTAneous route nightly.       potassium chloride (K-DUR, KLOR-CON) 20 mEq tablet TAKE 1 TABLET DAILY, TAKE  AN EXTRA DOSE IF YOU TAKE  THE EXTRA DOSE OF  FUROSEMIDE 180 Tab 1    Insulin Syringe-Needle U-100 (ADVOCATE SYRINGES) 0.3 mL 31 gauge x 5/16\" syrg For insulin dosing  Syringe 11    Insulin Syringe-Needle U-100 (ADVOCATE SYRINGES) 1 mL 31 gauge x 5/16 syrg Using for insulin dosing once a day 90 Syringe 11    Blood-Glucose Meter monitoring kit by Does Not Apply route. One touch ultra mini      Insulin Needles, Disposable, 31 gauge x 5/16\" ndle QID insulin dosing SC 1 Package 11    OTHER Kratom 1 mg capsule 1-2 every 4 hours as needed.  naloxone (NARCAN) 4 mg/actuation nasal spray Use 1 spray intranasally, then discard. Repeat with new spray every 2 min as needed for opioid overdose symptoms, alternating nostrils. 1 Each 1    INSULIN ASPART (NOVOLOG SC) by SubCUTAneous route Before breakfast, lunch, and dinner.  = 4 units  121-160 = 6 units  161-200 = 10 units  201-250 = 12 units  251-300 = 16 units  301-400 = 20 units  401-500 = 30 units  >501 = 40 units  Indications: sliding scale       No current facility-administered medications on file prior to visit. Allergies   Allergen Reactions    Adhesive Contact Dermatitis     Severe blistering from steri strips following surgery (no systemic reaction or breathing issues)    Lipitor [Atorvastatin] Myalgia       Objective:  Visit Vitals  BP (168/86)     Pulse 69   Temp 97.2 °F (36.2 °C) (Oral)   Resp 16   Ht 5' 9\" (1.753 m)   Wt 243 lb (110.2 kg)   SpO2 98%   BMI 35.88 kg/m²    Body mass index is 35.88 kg/m². Constitutional: Well developed, nourished, no distress, alert   HENT: Exterior ears and tympanic membranes normal bilaterally. Supple neck. No thyromegaly or lymphadenopathy. Oropharynx clear and moist mucous membranes. Eyes: Conjunctiva normal. PERRL. CV: S1, S2.  RRR. No murmurs/rubs. No thrills palpated. No carotid bruits. Intact distal pulses. No edema. Pulm: No abnormalities on inspection. Clear to auscultation bilaterally. No wheezing/rhonchi. Normal effort.      GI: Soft, nontender, nondistended. Normal active bowel sounds. No  masses on palpation. No hepatosplenomegaly.

## 2020-02-19 NOTE — PROGRESS NOTES
1. Have you been to the ER, urgent care clinic since your last visit? Hospitalized since your last visit? No.     2. Have you seen or consulted any other health care providers outside of the [address] [place] [address] since your last visit? Include any pap smears or colon screening. No.     Chief Complaint   Patient presents with    New Patient    Knee Pain     Both knees and muscle spasm.  Cough     yellowish phlegm with chest congestion x2 weeks.

## 2020-02-19 NOTE — PATIENT INSTRUCTIONS
Diane Chong PA-C  with [facility].  Address: Formerly named [facility] [address], [place] [address]         Lateral Collateral Ligament (LCL) Injury: Care Instructions  Overview    The lateral collateral ligament (LCL) is a band of tissue on the outside of your knee. It connects your thighbone (femur) to the bone of your lower leg (fibula). It helps keep the knee from bending outward. You can sprain or tear this ligament during activities that involve bending, twisting, or a quick change of direction. The ligament might be injured in sports such as football or soccer when the inside of the knee is hit. Minor LCL injuries usually get better with treatment at home. Your doctor may suggest that you wear a brace. It can help support your knee. You may need physical therapy for a moderate injury. A severe tear may need surgery. Depending on how bad the injury is, healing could take weeks or months. Follow-up care is a key part of your treatment and safety. Be sure to make and go to all appointments, and call your doctor if you are having problems. It's also a good idea to know your test results and keep a list of the medicines you take. How can you care for yourself at home? · Put ice or a cold pack on your knee for 10 to 20 minutes at a time. Try to do this every 1 to 2 hours (when you're awake) for the first 3 days after your injury or until the swelling goes down. Put a thin cloth between the ice and your skin. · Prop up your leg on a pillow when you ice it or anytime you sit or lie down. Do this for about 3 days after your injury. Try to keep your knee above the level of your heart. This will help reduce swelling. · Take anti-inflammatory medicines to reduce pain and swelling. These include ibuprofen (Advil, Motrin) and naproxen (Aleve). Be safe with medicines. Read and follow all instructions on the label.   · Follow instructions about how much weight you can put on your leg and how to walk with crutches, if your doctor recommends them. · Wear a brace, if your doctor recommends it, to protect and support your knee while it heals. Wear it as directed. · Do stretches or strength exercises as your doctor suggests. When should you call for help? Call  911 anytime you think you may need emergency care. For example, call if:    · You have chest pain, are short of breath, or cough up blood.    Call your doctor now or seek immediate medical care if:    · You have new or worse pain.     · Your foot is cool or pale or changes color.     · Your foot or toes are tingly, weak, or numb.     · You have signs of a blood clot in your leg (called a deep vein thrombosis), such as:  ? Pain in your calf, back of the knee, thigh, or groin. ? Redness or swelling in your leg.    Watch closely for changes in your health, and be sure to contact your doctor if:    · You do not get better as expected. Where can you learn more? Go to http://anaid-manjit.info/. Enter L255 in the search box to learn more about \"Lateral Collateral Ligament (LCL) Injury: Care Instructions. \"  Current as of: June 26, 2019  Content Version: 12.2  © 4784-1711 [employer]. Care instructions adapted under license by [employer] (which disclaims liability or warranty for this information). If you have questions about a medical condition or this instruction, always ask your healthcare professional. [place] [address] any warranty or liability for your use of this information. Lateral Collateral Ligament Sprain: Rehab Exercises  Introduction  Here are some examples of exercises for you to try. The exercises may be suggested for a condition or for rehabilitation. Start each exercise slowly. Ease off the exercises if you start to have pain. You will be told when to start these exercises and which ones will work best for you. How to do the exercises  Knee flexion with heel slide    1.  Lie on your back with your knees bent. 2. Slide your heel back by bending your affected knee as far as you can. Then hook your other foot around your ankle to help pull your heel even farther back. 3. Hold for about 6 seconds, then rest for up to 10 seconds. 4. Repeat 8 to 12 times. Heel slides on a wall    1. Lie on the floor close enough to a wall so that you can place both legs up on the wall. Your hips should be as close to the wall as is comfortable for you. 2. Start with both feet resting on the wall. Slowly let the foot of your affected leg slide down the wall until you feel a stretch in your knee. 3. Hold for 15 to 30 seconds. 4. Then slowly slide your foot up to where you started. 5. Repeat 2 to 4 times. Quad sets    1. Sit with your affected leg straight and supported on the floor or a firm bed. Place a small, rolled-up towel under your knee. Your other leg should be bent, with that foot flat on the floor. 2. Tighten the thigh muscles of your affected leg by pressing the back of your knee down into the towel. 3. Hold for about 6 seconds, then rest for up to 10 seconds. 4. Repeat 8 to 12 times. Short-arc quad    1. Lie on your back with your knees bent over a foam roll or a large rolled-up towel. 2. Lift the lower part of your affected leg and straighten your knee by tightening your thigh muscle. Keep the bottom of your knee on the foam roll or rolled-up towel. 3. Hold your knee straight for about 6 seconds, then slowly bend your knee and lower your leg back to the floor. Rest for up to 10 seconds between repetitions. 4. Repeat 8 to 12 times. Straight-leg raises to the front    1. Lie on your back with your good knee bent so that your foot rests flat on the floor. Your affected leg should be straight. Make sure that your low back has a normal curve.  You should be able to slip your hand in between the floor and the small of your back, with your palm touching the floor and your back touching the back of your hand. 2. Tighten the thigh muscles in your affected leg by pressing the back of your knee flat down to the floor. Hold your knee straight. 3. Keeping the thigh muscles tight and your leg straight, lift your affected leg up so that your heel is about 12 inches off the floor. Hold for about 6 seconds, then lower slowly. 4. Relax for up to 10 seconds between repetitions. 5. Repeat 8 to 12 times. Hamstring set (heel dig)    1. Sit with your affected leg bent. Your good leg should be straight and supported on the floor. 2. Tighten the muscles on the back of your bent leg (hamstring) by pressing your heel into the floor. 3. Hold for about 6 seconds, then rest for up to 10 seconds. 4. Repeat 8 to 12 times. Hip adduction    1. Sit on the floor with your knees bent. 2. Place a pillow between your knees. 3. Put your hands slightly behind your hips for support. 4. Squeeze the pillow by tightening the muscles on the inside of your thighs. 5. Hold for 6 seconds, then rest for up to 10 seconds. 6. Repeat 8 to 12 times. Hip abduction    1. Sit on the floor with your affected knee close to a wall. 2. Bend your affected knee but keep the other leg straight in front of you. 3. Place a pillow between the outside of your knee and the wall. 4. Put your hands slightly behind your hips for support. 5. Push the outside of your knee against the pillow and the wall. 6. Hold for 6 seconds, then rest for up to 10 seconds. 7. Repeat 8 to 12 times. Lateral step-up    1. Stand sideways on the bottom step of a staircase with your injured leg on the step and your other foot on the floor. Hold on to the banister or wall. 2. Use your injured leg to raise yourself up, bringing your other foot level with the stair step. Make sure to keep your hips level as you do this. And try to keep your knee moving in a straight line with your middle toe.  Do not put the foot you are raising on the stair step.  3. Slowly lower your foot back down. 4. Repeat 8 to 12 times. Wall squats with ball    1. Stand with your back facing a wall. Place your feet about a shoulder-width apart. 2. Place the therapy ball between your back and the wall, and move your feet out in front of you so they are about a foot in front of your hips. 3. Keep your arms at your sides, or put your hands on your hips. 4. Slowly squat down as if you are going to sit in a chair, rolling your back over the ball as you squat. The ball should move with you but stay pressed into the wall. 5. Be sure that your knees do not go in front of your toes as you squat. 6. Hold for 6 seconds. 7. Slowly rise to your standing position. 8. Repeat 8 to 12 times. Follow-up care is a key part of your treatment and safety. Be sure to make and go to all appointments, and call your doctor if you are having problems. It's also a good idea to know your test results and keep a list of the medicines you take. Where can you learn more? Go to http://anaid-manjit.info/. Enter A255 in the search box to learn more about \"Lateral Collateral Ligament Sprain: Rehab Exercises. \"  Current as of: June 26, 2019  Content Version: 12.2  © 4280-9287 [employer]. Care instructions adapted under license by [employer] (which disclaims liability or warranty for this information). If you have questions about a medical condition or this instruction, always ask your healthcare professional. [place] [address] any warranty or liability for your use of this information.

## 2020-07-25 ENCOUNTER — TELEPHONE ENCOUNTER (OUTPATIENT)
Dept: URBAN - METROPOLITAN AREA CLINIC 13 | Facility: CLINIC | Age: 55
End: 2020-07-25

## 2020-07-26 ENCOUNTER — TELEPHONE ENCOUNTER (OUTPATIENT)
Dept: URBAN - METROPOLITAN AREA CLINIC 13 | Facility: CLINIC | Age: 55
End: 2020-07-26

## 2020-09-15 PROBLEM — R42 DIZZINESS: Status: ACTIVE | Noted: 2020-09-15

## 2020-09-15 PROBLEM — E11.10 DIABETIC KETOACIDOSIS (HCC): Status: ACTIVE | Noted: 2020-09-15
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
## 2020-09-15 PROBLEM — E87.1 HYPONATREMIA: Status: ACTIVE | Noted: 2020-09-15

## 2020-09-15 PROBLEM — R41.82 ALTERED MENTAL STATUS: Status: ACTIVE | Noted: 2020-09-15

## 2020-09-15 PROBLEM — S30.0XXA LUMBAR CONTUSION: Status: ACTIVE | Noted: 2020-09-15

## 2020-11-23 ENCOUNTER — IMPORTED ENCOUNTER (OUTPATIENT)
Dept: URBAN - METROPOLITAN AREA CLINIC 1 | Facility: CLINIC | Age: 55
End: 2020-11-23

## 2020-11-23 PROBLEM — H16.143: Noted: 2020-11-23

## 2020-11-23 PROBLEM — H11.153: Noted: 2020-11-23

## 2020-11-23 PROBLEM — Z79.4: Noted: 2020-11-23

## 2020-11-23 PROBLEM — E11.9: Noted: 2020-11-23

## 2020-11-23 PROBLEM — H43.813: Noted: 2020-11-23

## 2020-11-23 PROBLEM — H04.123: Noted: 2020-11-23

## 2020-11-23 PROBLEM — H25.813: Noted: 2020-11-23

## 2020-11-23 PROCEDURE — 92015 DETERMINE REFRACTIVE STATE: CPT

## 2020-11-23 PROCEDURE — 92004 COMPRE OPH EXAM NEW PT 1/>: CPT

## 2020-11-23 NOTE — PATIENT DISCUSSION
1.  DM Type II (Insulin) without sign of diabetic retinopathy and no blot heme on dilated retinal examination today OU No Macular Edema:  Discussed the pathophysiology of diabetes and its effect on the eye and risk of blindness. Stressed the importance of strong glucose control. Advised of importance of at least yearly dilated examinations but to contact us immediately for any problems or concerns. 2. ZAHEER w/ PEK OU-Recommend ATs BID OU routinely. 3. Combined Cataract OU: Observe for now without intervention. The patient was advised to contact us if any change or worsening of vision4. Pinguecula OU -- Use of sunglasses when exposed to UV light and observation is recommended. 5. PVD OU - RD precautions. MRX for glasses given. Return for an appointment in 1 year 27 with Dr. Lázaro Crespo.

## 2020-12-31 PROBLEM — N17.9 ACUTE ON CHRONIC RENAL FAILURE (HCC): Status: ACTIVE | Noted: 2020-12-31

## 2020-12-31 PROBLEM — N18.9 ACUTE ON CHRONIC RENAL FAILURE (HCC): Status: ACTIVE | Noted: 2020-12-31

## 2021-04-24 PROBLEM — G93.40 ENCEPHALOPATHY: Status: ACTIVE | Noted: 2021-04-24

## 2021-04-24 PROBLEM — G93.41 ACUTE METABOLIC ENCEPHALOPATHY: Status: ACTIVE | Noted: 2021-04-24

## 2021-04-24 PROBLEM — E16.2 HYPOGLYCEMIA: Status: ACTIVE | Noted: 2021-04-24

## 2021-05-12 PROBLEM — I50.9 ACUTE CHF (CONGESTIVE HEART FAILURE) (HCC): Status: ACTIVE | Noted: 2021-05-12

## 2021-06-19 PROBLEM — D64.9 ANEMIA: Status: ACTIVE | Noted: 2021-06-19

## 2021-06-19 PROBLEM — R06.09 DOE (DYSPNEA ON EXERTION): Status: ACTIVE | Noted: 2021-06-19

## 2021-06-19 PROBLEM — N17.9 ACUTE NONTRAUMATIC KIDNEY INJURY (HCC): Status: ACTIVE | Noted: 2021-06-19

## 2021-06-19 PROBLEM — R06.02 SHORTNESS OF BREATH: Status: ACTIVE | Noted: 2021-06-19

## 2021-06-19 PROBLEM — I50.9 CONGESTIVE HEART FAILURE (CHF) (HCC): Status: ACTIVE | Noted: 2021-06-19

## 2021-10-10 PROBLEM — R07.9 CHEST PAIN: Status: ACTIVE | Noted: 2021-10-10

## 2021-10-10 PROBLEM — N17.9 ACUTE RENAL FAILURE (ARF) (HCC): Status: ACTIVE | Noted: 2021-10-10

## 2022-04-02 ASSESSMENT — VISUAL ACUITY
OD_SC: J1+
OD_CC: 20/25-1
OS_CC: 20/30-2
OS_SC: J1

## 2022-04-02 ASSESSMENT — TONOMETRY
OS_IOP_MMHG: 14
OD_IOP_MMHG: 14

## 2024-10-16 NOTE — PROGRESS NOTES
Chief Complaint   Patient presents with   [facility] Follow Up     states that he took large amounts of Kratom to calm himself because he is angry.  Form Completion     FMLA paperwork due to being out of work frequently     1. Have you been to the ER, urgent care clinic since your last visit? Hospitalized since your last visit? [facility]-swelling, shortness of breath    2. Have you seen or consulted any other health care providers outside of the [address] [place] [address] since your last visit? Include any pap smears or colon screening.  No Pt called to cancel Cardiac Rehab fortoday due to lack of transportation.

## 2025-01-15 NOTE — PROGRESS NOTES
Patient presents for lab draw ordered by:    Ordering Provider:  Dr. Ebony Bell Department/Practice:  [facility]  Phone:  [phone]  Date Ordered:  5/22/19    The following labs were drawn and sent to [facility] by Charles Finch LPN:    Renal Function Panel    The following tubes were sent:    SST (1)    L Cephalic cleansed with aseptic technique. Specimen obtained by using a 21 gauge butterfly needle with 1 attempt. Patient tolerated well and voiced no complaints. Please complete a CXR when you get a chance    Please discontinue Symbicort    Please start taking nebulizer treatments twice a day in the following order    Bill followed by pulmicort nebs twice a day- Rinses and gargle with water and spit afterwards    BREATHING SHOULD BE SUPERVISED    Please try to restrict daily calories and try to lose weight    As your breathing improves, try to go back to your laps again- slowly    RSV vaccination is recommended for you